# Patient Record
Sex: FEMALE | Race: WHITE | NOT HISPANIC OR LATINO | URBAN - METROPOLITAN AREA
[De-identification: names, ages, dates, MRNs, and addresses within clinical notes are randomized per-mention and may not be internally consistent; named-entity substitution may affect disease eponyms.]

---

## 2019-06-03 ENCOUNTER — OUTPATIENT (OUTPATIENT)
Dept: OUTPATIENT SERVICES | Facility: HOSPITAL | Age: 34
LOS: 1 days | Discharge: HOME | End: 2019-06-03

## 2019-06-03 ENCOUNTER — APPOINTMENT (OUTPATIENT)
Dept: ANTEPARTUM | Facility: CLINIC | Age: 34
End: 2019-06-03
Payer: MEDICARE

## 2019-06-03 ENCOUNTER — ASOB RESULT (OUTPATIENT)
Age: 34
End: 2019-06-03

## 2019-06-03 DIAGNOSIS — M26.12 OTHER JAW ASYMMETRY: Chronic | ICD-10-CM

## 2019-06-03 DIAGNOSIS — S83.209A UNSPECIFIED TEAR OF UNSPECIFIED MENISCUS, CURRENT INJURY, UNSPECIFIED KNEE, INITIAL ENCOUNTER: Chronic | ICD-10-CM

## 2019-06-03 DIAGNOSIS — O00.91 UNSPECIFIED ECTOPIC PREGNANCY WITH INTRAUTERINE PREGNANCY: ICD-10-CM

## 2019-06-03 PROCEDURE — 76801 OB US < 14 WKS SINGLE FETUS: CPT

## 2019-06-03 PROCEDURE — 96372 THER/PROPH/DIAG INJ SC/IM: CPT

## 2019-06-03 PROCEDURE — 76817 TRANSVAGINAL US OBSTETRIC: CPT

## 2019-06-03 RX ORDER — METHOTREXATE 2.5 MG/1
100 TABLET ORAL ONCE
Refills: 0 | Status: DISCONTINUED | OUTPATIENT
Start: 2019-06-03 | End: 2019-06-18

## 2020-06-19 ENCOUNTER — APPOINTMENT (OUTPATIENT)
Dept: OBGYN | Facility: CLINIC | Age: 35
End: 2020-06-19
Payer: MEDICARE

## 2020-06-19 PROCEDURE — 36415 COLL VENOUS BLD VENIPUNCTURE: CPT

## 2020-06-19 PROCEDURE — 99385 PREV VISIT NEW AGE 18-39: CPT

## 2020-06-19 PROCEDURE — 81025 URINE PREGNANCY TEST: CPT

## 2020-07-07 ENCOUNTER — APPOINTMENT (OUTPATIENT)
Dept: ANTEPARTUM | Facility: CLINIC | Age: 35
End: 2020-07-07
Payer: MEDICARE

## 2020-07-07 PROCEDURE — 76801 OB US < 14 WKS SINGLE FETUS: CPT

## 2020-07-28 ENCOUNTER — APPOINTMENT (OUTPATIENT)
Dept: ANTEPARTUM | Facility: CLINIC | Age: 35
End: 2020-07-28
Payer: MEDICARE

## 2020-07-28 PROCEDURE — 76813 OB US NUCHAL MEAS 1 GEST: CPT

## 2020-08-21 ENCOUNTER — APPOINTMENT (OUTPATIENT)
Dept: OBGYN | Facility: CLINIC | Age: 35
End: 2020-08-21
Payer: MEDICARE

## 2020-08-21 PROCEDURE — 0502F SUBSEQUENT PRENATAL CARE: CPT

## 2020-08-25 ENCOUNTER — APPOINTMENT (OUTPATIENT)
Dept: OBGYN | Facility: CLINIC | Age: 35
End: 2020-08-25

## 2020-09-18 ENCOUNTER — APPOINTMENT (OUTPATIENT)
Dept: ANTEPARTUM | Facility: CLINIC | Age: 35
End: 2020-09-18
Payer: MEDICARE

## 2020-09-18 PROCEDURE — 76817 TRANSVAGINAL US OBSTETRIC: CPT

## 2020-09-18 PROCEDURE — 76811 OB US DETAILED SNGL FETUS: CPT

## 2020-10-02 ENCOUNTER — APPOINTMENT (OUTPATIENT)
Dept: ANTEPARTUM | Facility: CLINIC | Age: 35
End: 2020-10-02
Payer: MEDICARE

## 2020-10-02 PROCEDURE — 76815 OB US LIMITED FETUS(S): CPT

## 2020-10-02 PROCEDURE — 76817 TRANSVAGINAL US OBSTETRIC: CPT

## 2020-10-13 ENCOUNTER — APPOINTMENT (OUTPATIENT)
Dept: OBGYN | Facility: CLINIC | Age: 35
End: 2020-10-13
Payer: MEDICARE

## 2020-10-13 PROCEDURE — 0502F SUBSEQUENT PRENATAL CARE: CPT

## 2020-10-16 ENCOUNTER — APPOINTMENT (OUTPATIENT)
Dept: OBGYN | Facility: CLINIC | Age: 35
End: 2020-10-16

## 2020-10-27 ENCOUNTER — APPOINTMENT (OUTPATIENT)
Dept: OBGYN | Facility: CLINIC | Age: 35
End: 2020-10-27
Payer: MEDICARE

## 2020-10-27 PROCEDURE — 0502F SUBSEQUENT PRENATAL CARE: CPT

## 2020-11-20 ENCOUNTER — APPOINTMENT (OUTPATIENT)
Dept: OBGYN | Facility: CLINIC | Age: 35
End: 2020-11-20
Payer: MEDICARE

## 2020-11-20 PROCEDURE — 0502F SUBSEQUENT PRENATAL CARE: CPT

## 2020-12-04 ENCOUNTER — APPOINTMENT (OUTPATIENT)
Dept: ANTEPARTUM | Facility: CLINIC | Age: 35
End: 2020-12-04
Payer: MEDICARE

## 2020-12-04 ENCOUNTER — APPOINTMENT (OUTPATIENT)
Dept: ANTEPARTUM | Facility: CLINIC | Age: 35
End: 2020-12-04

## 2020-12-04 PROCEDURE — 99072 ADDL SUPL MATRL&STAF TM PHE: CPT

## 2020-12-04 PROCEDURE — 76819 FETAL BIOPHYS PROFIL W/O NST: CPT

## 2020-12-04 PROCEDURE — 76816 OB US FOLLOW-UP PER FETUS: CPT

## 2020-12-18 ENCOUNTER — APPOINTMENT (OUTPATIENT)
Dept: OBGYN | Facility: CLINIC | Age: 35
End: 2020-12-18
Payer: MEDICARE

## 2020-12-18 PROCEDURE — 0502F SUBSEQUENT PRENATAL CARE: CPT

## 2020-12-22 ENCOUNTER — ASOB RESULT (OUTPATIENT)
Age: 35
End: 2020-12-22

## 2020-12-22 ENCOUNTER — APPOINTMENT (OUTPATIENT)
Dept: ANTEPARTUM | Facility: CLINIC | Age: 35
End: 2020-12-22
Payer: MEDICARE

## 2020-12-22 ENCOUNTER — INPATIENT (INPATIENT)
Facility: HOSPITAL | Age: 35
LOS: 1 days | Discharge: HOME | End: 2020-12-24
Attending: OBSTETRICS & GYNECOLOGY | Admitting: OBSTETRICS & GYNECOLOGY
Payer: MEDICAID

## 2020-12-22 ENCOUNTER — OUTPATIENT (OUTPATIENT)
Dept: OUTPATIENT SERVICES | Facility: HOSPITAL | Age: 35
LOS: 1 days | End: 2020-12-22

## 2020-12-22 VITALS
TEMPERATURE: 98 F | RESPIRATION RATE: 16 BRPM | SYSTOLIC BLOOD PRESSURE: 109 MMHG | HEART RATE: 107 BPM | DIASTOLIC BLOOD PRESSURE: 64 MMHG

## 2020-12-22 DIAGNOSIS — M26.12 OTHER JAW ASYMMETRY: Chronic | ICD-10-CM

## 2020-12-22 DIAGNOSIS — S83.209A UNSPECIFIED TEAR OF UNSPECIFIED MENISCUS, CURRENT INJURY, UNSPECIFIED KNEE, INITIAL ENCOUNTER: Chronic | ICD-10-CM

## 2020-12-22 DIAGNOSIS — O34.593 MATERNAL CARE FOR OTHER ABNORMALITIES OF GRAVID UTERUS, THIRD TRIMESTER: ICD-10-CM

## 2020-12-22 DIAGNOSIS — O26.899 OTHER SPECIFIED PREGNANCY RELATED CONDITIONS, UNSPECIFIED TRIMESTER: ICD-10-CM

## 2020-12-22 DIAGNOSIS — O46.93 ANTEPARTUM HEMORRHAGE, UNSPECIFIED, THIRD TRIMESTER: ICD-10-CM

## 2020-12-22 DIAGNOSIS — Z3A.00 WEEKS OF GESTATION OF PREGNANCY NOT SPECIFIED: ICD-10-CM

## 2020-12-22 LAB
APPEARANCE UR: ABNORMAL
APTT BLD: 30.9 SEC — SIGNIFICANT CHANGE UP (ref 27–36.3)
BACTERIA # UR AUTO: NEGATIVE — SIGNIFICANT CHANGE UP
BASOPHILS # BLD AUTO: 0.02 K/UL — SIGNIFICANT CHANGE UP (ref 0–0.2)
BASOPHILS NFR BLD AUTO: 0.3 % — SIGNIFICANT CHANGE UP (ref 0–2)
BILIRUB UR-MCNC: NEGATIVE — SIGNIFICANT CHANGE UP
BLD GP AB SCN SERPL QL: NEGATIVE — SIGNIFICANT CHANGE UP
COLOR SPEC: ABNORMAL
DIFF PNL FLD: ABNORMAL
EOSINOPHIL # BLD AUTO: 0.06 K/UL — SIGNIFICANT CHANGE UP (ref 0–0.5)
EOSINOPHIL NFR BLD AUTO: 0.9 % — SIGNIFICANT CHANGE UP (ref 0–6)
EPI CELLS # UR: 2 /HPF — SIGNIFICANT CHANGE UP (ref 0–5)
GLUCOSE UR QL: NEGATIVE — SIGNIFICANT CHANGE UP
HCT VFR BLD CALC: 32.2 % — LOW (ref 34.5–45)
HGB BLD-MCNC: 10.6 G/DL — LOW (ref 11.5–15.5)
HYALINE CASTS # UR AUTO: 1 /LPF — SIGNIFICANT CHANGE UP (ref 0–7)
IANC: 5.04 K/UL — SIGNIFICANT CHANGE UP (ref 1.5–8.5)
IMM GRANULOCYTES NFR BLD AUTO: 0.6 % — SIGNIFICANT CHANGE UP (ref 0–1.5)
INR BLD: 1.1 RATIO — SIGNIFICANT CHANGE UP (ref 0.88–1.17)
KETONES UR-MCNC: NEGATIVE — SIGNIFICANT CHANGE UP
LEUKOCYTE ESTERASE UR-ACNC: NEGATIVE — SIGNIFICANT CHANGE UP
LYMPHOCYTES # BLD AUTO: 0.98 K/UL — LOW (ref 1–3.3)
LYMPHOCYTES # BLD AUTO: 14.2 % — SIGNIFICANT CHANGE UP (ref 13–44)
MCHC RBC-ENTMCNC: 29.1 PG — SIGNIFICANT CHANGE UP (ref 27–34)
MCHC RBC-ENTMCNC: 32.9 GM/DL — SIGNIFICANT CHANGE UP (ref 32–36)
MCV RBC AUTO: 88.5 FL — SIGNIFICANT CHANGE UP (ref 80–100)
MONOCYTES # BLD AUTO: 0.74 K/UL — SIGNIFICANT CHANGE UP (ref 0–0.9)
MONOCYTES NFR BLD AUTO: 10.8 % — SIGNIFICANT CHANGE UP (ref 2–14)
NEUTROPHILS # BLD AUTO: 5.04 K/UL — SIGNIFICANT CHANGE UP (ref 1.8–7.4)
NEUTROPHILS NFR BLD AUTO: 73.2 % — SIGNIFICANT CHANGE UP (ref 43–77)
NITRITE UR-MCNC: NEGATIVE — SIGNIFICANT CHANGE UP
NRBC # BLD: 0 /100 WBCS — SIGNIFICANT CHANGE UP
NRBC # FLD: 0 K/UL — SIGNIFICANT CHANGE UP
PH UR: 7 — SIGNIFICANT CHANGE UP (ref 5–8)
PLATELET # BLD AUTO: 148 K/UL — LOW (ref 150–400)
PROT UR-MCNC: ABNORMAL
PROTHROM AB SERPL-ACNC: 12.5 SEC — SIGNIFICANT CHANGE UP (ref 9.8–13.1)
RBC # BLD: 3.64 M/UL — LOW (ref 3.8–5.2)
RBC # FLD: 13.4 % — SIGNIFICANT CHANGE UP (ref 10.3–14.5)
RBC CASTS # UR COMP ASSIST: 362 /HPF — HIGH (ref 0–4)
RH IG SCN BLD-IMP: POSITIVE — SIGNIFICANT CHANGE UP
SARS-COV-2 RNA SPEC QL NAA+PROBE: SIGNIFICANT CHANGE UP
SP GR SPEC: 1.01 — LOW (ref 1.01–1.02)
UROBILINOGEN FLD QL: SIGNIFICANT CHANGE UP
WBC # BLD: 6.88 K/UL — SIGNIFICANT CHANGE UP (ref 3.8–10.5)
WBC # FLD AUTO: 6.88 K/UL — SIGNIFICANT CHANGE UP (ref 3.8–10.5)
WBC UR QL: 4 /HPF — SIGNIFICANT CHANGE UP (ref 0–5)

## 2020-12-22 PROCEDURE — 76817 TRANSVAGINAL US OBSTETRIC: CPT | Mod: 26

## 2020-12-22 PROCEDURE — 76821 MIDDLE CEREBRAL ARTERY ECHO: CPT | Mod: 26

## 2020-12-22 PROCEDURE — 76805 OB US >/= 14 WKS SNGL FETUS: CPT | Mod: 26

## 2020-12-22 RX ORDER — SODIUM CHLORIDE 9 MG/ML
3 INJECTION INTRAMUSCULAR; INTRAVENOUS; SUBCUTANEOUS EVERY 8 HOURS
Refills: 0 | Status: DISCONTINUED | OUTPATIENT
Start: 2020-12-22 | End: 2020-12-24

## 2020-12-22 RX ADMIN — Medication 12 MILLIGRAM(S): at 11:30

## 2020-12-22 NOTE — OB PROVIDER H&P - NSHPPHYSICALEXAM_GEN_ALL_CORE
abdomen soft, nontender  taus: sliup, posterior placenta, bpp 8/8, maddie 10.8, efw 1854 grams  sse: no active bleeding noted, cervix appears closed, 5 mL blood staining in vaginal vault, negative nitrazine/negative ferning  sve: 0/0/-3/I  nst reactive  toco: no ctx noted

## 2020-12-22 NOTE — OB PROVIDER H&P - ASSESSMENT
a/p: 35 y.o. White patient  BATSHEVA 2021 @ 33.2 weeks didelphic uterus, vaginal bleeding for observation    GBS culture collected and pending  covid 19 swab collected and pending  betamethasone course  discussed with Dr Mendoza, Dr Tavares and Dr Korina Hall, NP

## 2020-12-22 NOTE — OB PROVIDER TRIAGE NOTE - NS_OBGYNHISTORY_OBGYN_ALL_OB_FT
11/18/2009 primary cs 2/2 breech presentation 7lbs 1 oz F  10/7/2012 repeat cs 7lbs 11 oz M  2019 ectopic pregnancy treated with methotrexate

## 2020-12-22 NOTE — OB RN TRIAGE NOTE - PMH
Breech presentation    Didelphic uterus    Ectopic pregnancy  2019 methotrexate  Enlarged lymph node  lung

## 2020-12-22 NOTE — OB PROVIDER TRIAGE NOTE - HISTORY OF PRESENT ILLNESS
35 y.o. White patient  BATSHEVA 2021 @ 33.2 weeks presents with c/o vaginal bleeding since 6am. Pt states first episode this pregnancy. Pt states "there was blood on underwear and clots in the toilet." Pt denies ctx/abd pain. States +FM. Pt has didelphic uterus with pregnancy in left uterus.

## 2020-12-22 NOTE — OB RN TRIAGE NOTE - PSH
H/O  section  c/s x 2 09 breech f 7-1, repeat c/s 10/7/12 m 7-11  Jaw asymmetry  surgery to repair 2001 - maxillary screws placed  Meniscus tear  right knee -

## 2020-12-23 LAB
APPEARANCE UR: CLEAR — SIGNIFICANT CHANGE UP
APTT BLD: 27 SEC — SIGNIFICANT CHANGE UP (ref 27–36.3)
BASOPHILS # BLD AUTO: 0.02 K/UL — SIGNIFICANT CHANGE UP (ref 0–0.2)
BASOPHILS NFR BLD AUTO: 0.2 % — SIGNIFICANT CHANGE UP (ref 0–2)
BILIRUB UR-MCNC: NEGATIVE — SIGNIFICANT CHANGE UP
COLOR SPEC: SIGNIFICANT CHANGE UP
DIFF PNL FLD: ABNORMAL
EOSINOPHIL # BLD AUTO: 0.01 K/UL — SIGNIFICANT CHANGE UP (ref 0–0.5)
EOSINOPHIL NFR BLD AUTO: 0.1 % — SIGNIFICANT CHANGE UP (ref 0–6)
GLUCOSE UR QL: NEGATIVE — SIGNIFICANT CHANGE UP
HCT VFR BLD CALC: 32.2 % — LOW (ref 34.5–45)
HGB BLD-MCNC: 10.4 G/DL — LOW (ref 11.5–15.5)
IANC: 7.64 K/UL — SIGNIFICANT CHANGE UP (ref 1.5–8.5)
IMM GRANULOCYTES NFR BLD AUTO: 0.5 % — SIGNIFICANT CHANGE UP (ref 0–1.5)
INR BLD: 1.16 RATIO — SIGNIFICANT CHANGE UP (ref 0.88–1.17)
KETONES UR-MCNC: ABNORMAL
KLEIHAUER-BETKE CALCULATION: 0.1 % — SIGNIFICANT CHANGE UP
LEUKOCYTE ESTERASE UR-ACNC: NEGATIVE — SIGNIFICANT CHANGE UP
LYMPHOCYTES # BLD AUTO: 1.05 K/UL — SIGNIFICANT CHANGE UP (ref 1–3.3)
LYMPHOCYTES # BLD AUTO: 11 % — LOW (ref 13–44)
MCHC RBC-ENTMCNC: 28.8 PG — SIGNIFICANT CHANGE UP (ref 27–34)
MCHC RBC-ENTMCNC: 32.3 GM/DL — SIGNIFICANT CHANGE UP (ref 32–36)
MCV RBC AUTO: 89.2 FL — SIGNIFICANT CHANGE UP (ref 80–100)
MONOCYTES # BLD AUTO: 0.77 K/UL — SIGNIFICANT CHANGE UP (ref 0–0.9)
MONOCYTES NFR BLD AUTO: 8.1 % — SIGNIFICANT CHANGE UP (ref 2–14)
NEUTROPHILS # BLD AUTO: 7.64 K/UL — HIGH (ref 1.8–7.4)
NEUTROPHILS NFR BLD AUTO: 80.1 % — HIGH (ref 43–77)
NITRITE UR-MCNC: NEGATIVE — SIGNIFICANT CHANGE UP
NRBC # BLD: 0 /100 WBCS — SIGNIFICANT CHANGE UP
NRBC # FLD: 0 K/UL — SIGNIFICANT CHANGE UP
PH UR: 6 — SIGNIFICANT CHANGE UP (ref 5–8)
PLATELET # BLD AUTO: 141 K/UL — LOW (ref 150–400)
PROT UR-MCNC: NEGATIVE — SIGNIFICANT CHANGE UP
PROTHROM AB SERPL-ACNC: 13.2 SEC — HIGH (ref 9.8–13.1)
RBC # BLD: 3.61 M/UL — LOW (ref 3.8–5.2)
RBC # FLD: 13.3 % — SIGNIFICANT CHANGE UP (ref 10.3–14.5)
SARS-COV-2 IGG SERPL QL IA: NEGATIVE — SIGNIFICANT CHANGE UP
SARS-COV-2 IGM SERPL IA-ACNC: 0.01 INDEX — SIGNIFICANT CHANGE UP
SP GR SPEC: 1.01 — SIGNIFICANT CHANGE UP (ref 1.01–1.02)
T PALLIDUM AB TITR SER: NEGATIVE — SIGNIFICANT CHANGE UP
UROBILINOGEN FLD QL: SIGNIFICANT CHANGE UP
WBC # BLD: 9.54 K/UL — SIGNIFICANT CHANGE UP (ref 3.8–10.5)
WBC # FLD AUTO: 9.54 K/UL — SIGNIFICANT CHANGE UP (ref 3.8–10.5)

## 2020-12-23 PROCEDURE — 99232 SBSQ HOSP IP/OBS MODERATE 35: CPT | Mod: GC

## 2020-12-23 RX ORDER — FAMOTIDINE 10 MG/ML
20 INJECTION INTRAVENOUS DAILY
Refills: 0 | Status: DISCONTINUED | OUTPATIENT
Start: 2020-12-23 | End: 2020-12-24

## 2020-12-23 RX ORDER — FAMOTIDINE 10 MG/ML
20 INJECTION INTRAVENOUS ONCE
Refills: 0 | Status: COMPLETED | OUTPATIENT
Start: 2020-12-23 | End: 2020-12-23

## 2020-12-23 RX ADMIN — FAMOTIDINE 20 MILLIGRAM(S): 10 INJECTION INTRAVENOUS at 23:07

## 2020-12-23 RX ADMIN — SODIUM CHLORIDE 3 MILLILITER(S): 9 INJECTION INTRAMUSCULAR; INTRAVENOUS; SUBCUTANEOUS at 21:51

## 2020-12-23 RX ADMIN — SODIUM CHLORIDE 3 MILLILITER(S): 9 INJECTION INTRAMUSCULAR; INTRAVENOUS; SUBCUTANEOUS at 06:43

## 2020-12-23 RX ADMIN — FAMOTIDINE 20 MILLIGRAM(S): 10 INJECTION INTRAVENOUS at 13:56

## 2020-12-23 RX ADMIN — SODIUM CHLORIDE 3 MILLILITER(S): 9 INJECTION INTRAMUSCULAR; INTRAVENOUS; SUBCUTANEOUS at 13:57

## 2020-12-23 RX ADMIN — Medication 12 MILLIGRAM(S): at 11:41

## 2020-12-24 ENCOUNTER — TRANSCRIPTION ENCOUNTER (OUTPATIENT)
Age: 35
End: 2020-12-24

## 2020-12-24 VITALS
RESPIRATION RATE: 17 BRPM | TEMPERATURE: 99 F | OXYGEN SATURATION: 97 % | SYSTOLIC BLOOD PRESSURE: 97 MMHG | HEART RATE: 86 BPM | DIASTOLIC BLOOD PRESSURE: 51 MMHG

## 2020-12-24 DIAGNOSIS — Z04.9 ENCOUNTER FOR EXAMINATION AND OBSERVATION FOR UNSPECIFIED REASON: ICD-10-CM

## 2020-12-24 PROBLEM — Q51.28 OTHER AND UNSPECIFIED DOUBLING OF UTERUS: Chronic | Status: ACTIVE | Noted: 2020-12-22

## 2020-12-24 PROBLEM — O00.90 UNSPECIFIED ECTOPIC PREGNANCY WITHOUT INTRAUTERINE PREGNANCY: Chronic | Status: ACTIVE | Noted: 2020-12-22

## 2020-12-24 LAB
CULTURE RESULTS: SIGNIFICANT CHANGE UP
HCT VFR BLD CALC: 32.2 % — LOW (ref 34.5–45)
HGB BLD-MCNC: 10.1 G/DL — LOW (ref 11.5–15.5)
MCHC RBC-ENTMCNC: 28 PG — SIGNIFICANT CHANGE UP (ref 27–34)
MCHC RBC-ENTMCNC: 31.4 GM/DL — LOW (ref 32–36)
MCV RBC AUTO: 89.2 FL — SIGNIFICANT CHANGE UP (ref 80–100)
NRBC # BLD: 0 /100 WBCS — SIGNIFICANT CHANGE UP
NRBC # FLD: 0 K/UL — SIGNIFICANT CHANGE UP
PLATELET # BLD AUTO: 151 K/UL — SIGNIFICANT CHANGE UP (ref 150–400)
RBC # BLD: 3.61 M/UL — LOW (ref 3.8–5.2)
RBC # FLD: 13.5 % — SIGNIFICANT CHANGE UP (ref 10.3–14.5)
RH IG SCN BLD-IMP: POSITIVE — SIGNIFICANT CHANGE UP
SPECIMEN SOURCE: SIGNIFICANT CHANGE UP
WBC # BLD: 8.35 K/UL — SIGNIFICANT CHANGE UP (ref 3.8–10.5)
WBC # FLD AUTO: 8.35 K/UL — SIGNIFICANT CHANGE UP (ref 3.8–10.5)

## 2020-12-24 PROCEDURE — 99232 SBSQ HOSP IP/OBS MODERATE 35: CPT | Mod: GC

## 2020-12-24 RX ADMIN — SODIUM CHLORIDE 3 MILLILITER(S): 9 INJECTION INTRAMUSCULAR; INTRAVENOUS; SUBCUTANEOUS at 07:00

## 2020-12-24 NOTE — PROGRESS NOTE ADULT - ASSESSMENT
A/P: Pt is a 36yo  @ 33.4wks admitted with vaginal bleeding in setting of Didelphys uterus. Patient is beta complete. FHR reassuring. ATU sono normal. VB nearly resolved    #Vaginal bleeding  -Continue Pad counts  -BMZ for fetal lung maturity from -  -Continue fetal monitoring  -f/u AM CBC    #FWB  -ATU Sono: Vtx, Left lateral placenta, ROLANDO 15.2, EFW 2105g, CL 4.15, Dopplers wnl  -Continue NST BID    #MWB  -Reg diet  -Continue PNV  -d/c today after NST    Msantandreu PGY3
    A/P: Pt is a 34yo  @ 33.3wks admitted with vaginal bleeding in setting of Didelphys uterus. Patient had received BMZ x1 yesterday. VB has nearly resolved. Fetal monitoring reassuring.     #Vaginal bleeding  -Continue Pad counts  -BMZ for fetal lung maturity from -  -Continue fetal monitoring  -f/u AM CBC, KB, Coags    #FWB  -ATU Sono: Vtx, Left lateral placenta, ROLANDO 15.2, EFW 2105g, CL 4.15, Dopplers wnl  -Continue NST BID    #MWB  -Reg diet  -Continue PNV    Msantandreu PGY3

## 2020-12-24 NOTE — PROGRESS NOTE ADULT - SUBJECTIVE AND OBJECTIVE BOX
R3 Progress note: HD#2    Patient seen and examined at bedside, no acute overnight events. No acute complaints. Patient states that VB has significantly improved. She only sees small amount of staining while wiping. No clots or keith bright red blood.     Pt reports +FM. Denies LOF, ctx, HA, epigastric pain, blurred vision, CP, SOB, N/V, fevers, and chills.    Vital Signs Last 24 Hours  T(C): 36.7 (12-23-20 @ 06:22), Max: 36.8 (12-22-20 @ 13:37)  HR: 83 (12-23-20 @ 06:22) (75 - 99)  BP: 94/52 (12-23-20 @ 06:22) (89/60 - 111/61)  RR: 20 (12-23-20 @ 06:22) (16 - 20)  SpO2: 97% (12-23-20 @ 06:22) (94% - 99%)    CAPILLARY BLOOD GLUCOSE      Physical Exam:  General: NAD  Abdomen: Soft, non-tender, gravid  Ext: No pain or swelling    Labs:             10.4   9.54  )-----------( 141      ( 12-23 @ 06:45 )             32.2           PT/INR - ( 12-23 @ 06:45 )   PT: 13.2 sec;   INR: 1.16 ratio    PTT - ( 12-23 @ 06:45 )  PTT:27.0 sec        MEDICATIONS  (STANDING):  betamethasone Injectable 12 milliGRAM(s) IntraMuscular every 24 hours  sodium chloride 0.9% lock flush 3 milliLiter(s) IV Push every 8 hours    MEDICATIONS  (PRN):  
R3 Progress note: HD#3    Patient seen and examined at bedside, no acute overnight events. No acute complaints. Patient says that her spotting is minimal with wiping and in now dark brown.    Pt reports +FM. Denies LOF, VB, ctx, HA, epigastric pain, blurred vision, CP, SOB, N/V, fevers, and chills.    Vital Signs Last 24 Hours  T(C): 36.8 (12-24-20 @ 05:44), Max: 37.1 (12-23-20 @ 17:48)  HR: 82 (12-24-20 @ 05:44) (79 - 98)  BP: 97/55 (12-24-20 @ 05:44) (97/55 - 114/62)  RR: 18 (12-24-20 @ 05:44) (17 - 18)  SpO2: 95% (12-24-20 @ 05:44) (93% - 98%)    CAPILLARY BLOOD GLUCOSE          Physical Exam:  General: NAD  Abdomen: Soft, non-tender, gravid  Ext: No pain or swelling    Labs:             10.1   8.35  )-----------( 151      ( 12-24 @ 07:07 )             32.2           PT/INR - ( 12-23 @ 06:45 )   PT: 13.2 sec;   INR: 1.16 ratio    PTT - ( 12-23 @ 06:45 )  PTT:27.0 sec        MEDICATIONS  (STANDING):  famotidine    Tablet 20 milliGRAM(s) Oral daily  sodium chloride 0.9% lock flush 3 milliLiter(s) IV Push every 8 hours    MEDICATIONS  (PRN):

## 2020-12-24 NOTE — DISCHARGE NOTE ANTEPARTUM - PLAN OF CARE
- Follow up with Dr. Tavares on Monday 12/28  - Return with contractions, vaginal bleeding, leaking fluid or decreased fetal movement Recovery/ continue prenatal course

## 2020-12-24 NOTE — DISCHARGE NOTE ANTEPARTUM - HOSPITAL COURSE
Pt is a 36yo  at 33.4wks admitted with vaginal bleeding in setting of Didelphys uterus. On admission patient with no active bleeding noted on SSE. ATU sono WNL Vtx, Left lateral placenta, ROLANDO 15.2, EFW 2105g, CL 4.15, Dopplers wnl. BMZ for fetal lung maturity from -. Patient with no further vaginal bleeding. Denies contractions, leaking fluid. Endorses good fetal movement. Pt stable and ready to be discharged home with strict PTL precautions and close outpatient follow up in office on Monday.

## 2020-12-24 NOTE — DISCHARGE NOTE ANTEPARTUM - CARE PROVIDER_API CALL
Raleigh Tavares  MATERNAL/FETAL MEDICINE  [address] [place] [address]  Phone: [phone]  Fax: [phone]  Follow Up Time:

## 2020-12-24 NOTE — DISCHARGE NOTE ANTEPARTUM - CARE PLAN
Principal Discharge DX:	Vaginal bleeding in pregnancy, third trimester  Goal:	Recovery/ continue prenatal course  Assessment and plan of treatment:	- Follow up with Dr. Tavares on Monday 12/28  - Return with contractions, vaginal bleeding, leaking fluid or decreased fetal movement

## 2020-12-24 NOTE — DISCHARGE NOTE ANTEPARTUM - MEDICATION SUMMARY - MEDICATIONS TO TAKE
I will START or STAY ON the medications listed below when I get home from the hospital:    Pepcid  -- orally once a day (at bedtime)  -- Indication: For GERD    Prena1 oral capsule  -- 1 cap(s) by mouth once a day  -- Indication: For Pregnancy

## 2020-12-24 NOTE — DISCHARGE NOTE ANTEPARTUM - PATIENT PORTAL LINK FT
You can access the FollowMyHealth Patient Portal offered by [facility] by registering at the following website: [email or web address]. By joining [employer]’s FollowMyHealth portal, you will also be able to view your health information using other applications (apps) compatible with our system.

## 2020-12-24 NOTE — PROGRESS NOTE ADULT - ATTENDING COMMENTS
MFM Fellow addendum  seen and evaluated w Felipa Tavares and Korina  No further bleeding, feeling well  will d/c home at this time, has f/u in Anusha's office on Monday    MD BRITT PateM Fellow
MFM note:  Patient was seen and examined.  No complains now but had some bright red bleeding this morning  No contractions and FHR tracing is category I  Since this is new fresh bleeding will continue t o observe in hospital

## 2020-12-26 ENCOUNTER — TRANSCRIPTION ENCOUNTER (OUTPATIENT)
Age: 35
End: 2020-12-26

## 2020-12-26 ENCOUNTER — RESULT REVIEW (OUTPATIENT)
Age: 35
End: 2020-12-26

## 2020-12-26 ENCOUNTER — INPATIENT (INPATIENT)
Facility: HOSPITAL | Age: 35
LOS: 3 days | Discharge: HOME | End: 2020-12-30
Attending: OBSTETRICS & GYNECOLOGY | Admitting: OBSTETRICS & GYNECOLOGY
Payer: MEDICAID

## 2020-12-26 VITALS
HEART RATE: 100 BPM | TEMPERATURE: 98 F | RESPIRATION RATE: 20 BRPM | DIASTOLIC BLOOD PRESSURE: 65 MMHG | SYSTOLIC BLOOD PRESSURE: 108 MMHG

## 2020-12-26 DIAGNOSIS — O26.899 OTHER SPECIFIED PREGNANCY RELATED CONDITIONS, UNSPECIFIED TRIMESTER: ICD-10-CM

## 2020-12-26 DIAGNOSIS — M26.12 OTHER JAW ASYMMETRY: Chronic | ICD-10-CM

## 2020-12-26 DIAGNOSIS — Z3A.00 WEEKS OF GESTATION OF PREGNANCY NOT SPECIFIED: ICD-10-CM

## 2020-12-26 DIAGNOSIS — S83.209A UNSPECIFIED TEAR OF UNSPECIFIED MENISCUS, CURRENT INJURY, UNSPECIFIED KNEE, INITIAL ENCOUNTER: Chronic | ICD-10-CM

## 2020-12-26 LAB
APPEARANCE UR: CLEAR — SIGNIFICANT CHANGE UP
BASOPHILS # BLD AUTO: 0.02 K/UL — SIGNIFICANT CHANGE UP (ref 0–0.2)
BASOPHILS NFR BLD AUTO: 0.2 % — SIGNIFICANT CHANGE UP (ref 0–2)
BILIRUB UR-MCNC: NEGATIVE — SIGNIFICANT CHANGE UP
COLOR SPEC: COLORLESS — SIGNIFICANT CHANGE UP
DIFF PNL FLD: ABNORMAL
EOSINOPHIL # BLD AUTO: 0.07 K/UL — SIGNIFICANT CHANGE UP (ref 0–0.5)
EOSINOPHIL NFR BLD AUTO: 0.6 % — SIGNIFICANT CHANGE UP (ref 0–6)
GLUCOSE UR QL: NEGATIVE — SIGNIFICANT CHANGE UP
HCT VFR BLD CALC: 34.2 % — LOW (ref 34.5–45)
HGB BLD-MCNC: 11.3 G/DL — LOW (ref 11.5–15.5)
IANC: 8.73 K/UL — HIGH (ref 1.5–8.5)
IMM GRANULOCYTES NFR BLD AUTO: 0.3 % — SIGNIFICANT CHANGE UP (ref 0–1.5)
KETONES UR-MCNC: NEGATIVE — SIGNIFICANT CHANGE UP
LEUKOCYTE ESTERASE UR-ACNC: NEGATIVE — SIGNIFICANT CHANGE UP
LYMPHOCYTES # BLD AUTO: 1.49 K/UL — SIGNIFICANT CHANGE UP (ref 1–3.3)
LYMPHOCYTES # BLD AUTO: 12.9 % — LOW (ref 13–44)
MCHC RBC-ENTMCNC: 28.2 PG — SIGNIFICANT CHANGE UP (ref 27–34)
MCHC RBC-ENTMCNC: 33 GM/DL — SIGNIFICANT CHANGE UP (ref 32–36)
MCV RBC AUTO: 85.3 FL — SIGNIFICANT CHANGE UP (ref 80–100)
MONOCYTES # BLD AUTO: 1.16 K/UL — HIGH (ref 0–0.9)
MONOCYTES NFR BLD AUTO: 10.1 % — SIGNIFICANT CHANGE UP (ref 2–14)
NEUTROPHILS # BLD AUTO: 8.73 K/UL — HIGH (ref 1.8–7.4)
NEUTROPHILS NFR BLD AUTO: 75.9 % — SIGNIFICANT CHANGE UP (ref 43–77)
NITRITE UR-MCNC: NEGATIVE — SIGNIFICANT CHANGE UP
NRBC # BLD: 0 /100 WBCS — SIGNIFICANT CHANGE UP
NRBC # FLD: 0 K/UL — SIGNIFICANT CHANGE UP
PH UR: 7 — SIGNIFICANT CHANGE UP (ref 5–8)
PLATELET # BLD AUTO: 174 K/UL — SIGNIFICANT CHANGE UP (ref 150–400)
PROT UR-MCNC: NEGATIVE — SIGNIFICANT CHANGE UP
RBC # BLD: 4.01 M/UL — SIGNIFICANT CHANGE UP (ref 3.8–5.2)
RBC # FLD: 13.2 % — SIGNIFICANT CHANGE UP (ref 10.3–14.5)
SP GR SPEC: 1.01 — LOW (ref 1.01–1.02)
UROBILINOGEN FLD QL: SIGNIFICANT CHANGE UP
WBC # BLD: 11.51 K/UL — HIGH (ref 3.8–10.5)
WBC # FLD AUTO: 11.51 K/UL — HIGH (ref 3.8–10.5)

## 2020-12-26 RX ORDER — OXYTOCIN 10 UNIT/ML
333.33 VIAL (ML) INJECTION
Qty: 20 | Refills: 0 | Status: DISCONTINUED | OUTPATIENT
Start: 2020-12-26 | End: 2020-12-27

## 2020-12-26 RX ORDER — SODIUM CHLORIDE 9 MG/ML
1000 INJECTION, SOLUTION INTRAVENOUS
Refills: 0 | Status: DISCONTINUED | OUTPATIENT
Start: 2020-12-26 | End: 2020-12-27

## 2020-12-26 RX ORDER — SODIUM CHLORIDE 9 MG/ML
1000 INJECTION, SOLUTION INTRAVENOUS ONCE
Refills: 0 | Status: DISCONTINUED | OUTPATIENT
Start: 2020-12-26 | End: 2020-12-27

## 2020-12-26 RX ORDER — FAMOTIDINE 10 MG/ML
20 INJECTION INTRAVENOUS ONCE
Refills: 0 | Status: DISCONTINUED | OUTPATIENT
Start: 2020-12-26 | End: 2020-12-27

## 2020-12-26 RX ORDER — METOCLOPRAMIDE HCL 10 MG
10 TABLET ORAL ONCE
Refills: 0 | Status: DISCONTINUED | OUTPATIENT
Start: 2020-12-26 | End: 2020-12-27

## 2020-12-26 NOTE — OB PROVIDER TRIAGE NOTE - NSHPPHYSICALEXAM_GEN_ALL_CORE
Vital Signs Last 24 Hrs  T(C): 36.6 (26 Dec 2020 23:28), Max: 36.6 (26 Dec 2020 22:01)  T(F): 97.9 (26 Dec 2020 23:28), Max: 97.9 (26 Dec 2020 22:01)  HR: 100 (26 Dec 2020 23:28) (100 - 100)  BP: 108/65 (26 Dec 2020 23:28) (108/65 - 108/65)  BP(mean): --  RR: 16 (26 Dec 2020 23:28) (16 - 20)  SpO2: --    General: A&O x3   Abdomen: soft, non tender. no guarding or rebound tenderness  SSE:   dark brown mucousy discharge noted in vault  negative pooling  negative nitrazine  negative ferning   SVE: 1/50/-3  TAS: vertex presentation    NST in progress

## 2020-12-26 NOTE — OB PROVIDER TRIAGE NOTE - HISTORY OF PRESENT ILLNESS
1y y/o pt 33.6 weeks  24 y/o pt 33.6 weeks  presents to triage with c/o of painful contractions every 5 minutes. pt reports 9/10 pain with contraction. pt also states one episode of vaginal spotting, dark blood on underwear. pt denies any recent vaginal exam or intercourse. pt denies any LOF, n/v/d, fever or chills. pt endorses +fetal movement.   AP complicated by:  -Didelphys uterus  -Admitted on - for vaginal bleeding; s/p Betamethasone course on 20 & 2020  Last PO @ 20:00    Allergy:   Penicillin- hives  Colchicine-hives  PMH:   Enlarged lymph node in lung- benign  PSH:   Right meniscus tear repeat 15'  Jaw surgery- maxillary screws in place   OB:   Primary  2009 breech 7#1  Repeat  10/07/2012 breech 7#11  Ectopic pregnancy 09' s/p methotrexate  GYN: denies  Social hx: denies  Medications: PNV

## 2020-12-26 NOTE — OB PROVIDER TRIAGE NOTE - NSOBPROVIDERNOTE_OBGYN_ALL_OB_FT
Vital Signs Last 24 Hrs  T(C): 36.6 (26 Dec 2020 23:28), Max: 36.6 (26 Dec 2020 22:01)  T(F): 97.9 (26 Dec 2020 23:28), Max: 97.9 (26 Dec 2020 22:01)  HR: 100 (26 Dec 2020 23:28) (100 - 100)  BP: 108/65 (26 Dec 2020 23:28) (108/65 - 108/65)  BP(mean): --  RR: 16 (26 Dec 2020 23:28) (16 - 20)  SpO2: --    General: A&O x3   Abdomen: soft, non tender. no guarding or rebound tenderness  SSE:   dark brown mucousy discharge noted in vault  negative pooling  negative nitrazine  negative ferning   SVE: /-3  TAS: vertex presentation    NST reactive with moderate variability, cat 1  toco ctx q5-6 minutes    @ 2245  d/w with Dr Harrell  -Admit for observation for  labor vs placental abruption   -STAT 1L bolus      @ 2320  Patient reports increased pain   d/w with Dr Harrell & Dr Couch   Plan:  -Admit @ 33.6 weeks for repeat  for  labor   -NPO diet  -Routine labs  -2units PRBC on hold   -Fetus: cat 1 tracing, continuous monitoring   -GBS negative   -Covid 19 pending for patient and support person   -Consents signed and witnessed at bedside     @ 2330  Huddle with Dr Harrell, Dr Couch, Dr Salazar, charge RN

## 2020-12-26 NOTE — OB PROVIDER TRIAGE NOTE - NS_OBGYNHISTORY_OBGYN_ALL_OB_FT
OB:   Primary  2009 breech 7#1  Repeat  10/07/2012 breech 7#11  Ectopic pregnancy 09' s/p methotrexate  GYN: christineies

## 2020-12-27 LAB
HBV SURFACE AG SER-ACNC: SIGNIFICANT CHANGE UP
HIV 1+2 AB+HIV1 P24 AG SERPL QL IA: SIGNIFICANT CHANGE UP
SARS-COV-2 IGG SERPL QL IA: NEGATIVE — SIGNIFICANT CHANGE UP
SARS-COV-2 IGM SERPL IA-ACNC: 0.06 INDEX — SIGNIFICANT CHANGE UP
SARS-COV-2 RNA SPEC QL NAA+PROBE: SIGNIFICANT CHANGE UP

## 2020-12-27 PROCEDURE — 59510 CESAREAN DELIVERY: CPT | Mod: U7

## 2020-12-27 PROCEDURE — 88307 TISSUE EXAM BY PATHOLOGIST: CPT | Mod: 26

## 2020-12-27 RX ORDER — ACETAMINOPHEN 500 MG
975 TABLET ORAL
Refills: 0 | Status: DISCONTINUED | OUTPATIENT
Start: 2020-12-27 | End: 2020-12-30

## 2020-12-27 RX ORDER — TETANUS TOXOID, REDUCED DIPHTHERIA TOXOID AND ACELLULAR PERTUSSIS VACCINE, ADSORBED 5; 2.5; 8; 8; 2.5 [IU]/.5ML; [IU]/.5ML; UG/.5ML; UG/.5ML; UG/.5ML
0.5 SUSPENSION INTRAMUSCULAR ONCE
Refills: 0 | Status: DISCONTINUED | OUTPATIENT
Start: 2020-12-27 | End: 2020-12-30

## 2020-12-27 RX ORDER — FERROUS SULFATE 325(65) MG
325 TABLET ORAL DAILY
Refills: 0 | Status: DISCONTINUED | OUTPATIENT
Start: 2020-12-27 | End: 2020-12-30

## 2020-12-27 RX ORDER — DEXAMETHASONE 0.5 MG/5ML
4 ELIXIR ORAL EVERY 6 HOURS
Refills: 0 | Status: DISCONTINUED | OUTPATIENT
Start: 2020-12-27 | End: 2020-12-29

## 2020-12-27 RX ORDER — NALOXONE HYDROCHLORIDE 4 MG/.1ML
0.1 SPRAY NASAL
Refills: 0 | Status: DISCONTINUED | OUTPATIENT
Start: 2020-12-27 | End: 2020-12-29

## 2020-12-27 RX ORDER — OXYCODONE HYDROCHLORIDE 5 MG/1
5 TABLET ORAL ONCE
Refills: 0 | Status: DISCONTINUED | OUTPATIENT
Start: 2020-12-27 | End: 2020-12-30

## 2020-12-27 RX ORDER — LANOLIN
1 OINTMENT (GRAM) TOPICAL EVERY 6 HOURS
Refills: 0 | Status: DISCONTINUED | OUTPATIENT
Start: 2020-12-27 | End: 2020-12-30

## 2020-12-27 RX ORDER — OXYCODONE HYDROCHLORIDE 5 MG/1
5 TABLET ORAL
Refills: 0 | Status: DISCONTINUED | OUTPATIENT
Start: 2020-12-27 | End: 2020-12-27

## 2020-12-27 RX ORDER — OXYCODONE HYDROCHLORIDE 5 MG/1
10 TABLET ORAL
Refills: 0 | Status: DISCONTINUED | OUTPATIENT
Start: 2020-12-27 | End: 2020-12-27

## 2020-12-27 RX ORDER — KETOROLAC TROMETHAMINE 30 MG/ML
30 SYRINGE (ML) INJECTION EVERY 6 HOURS
Refills: 0 | Status: DISCONTINUED | OUTPATIENT
Start: 2020-12-27 | End: 2020-12-28

## 2020-12-27 RX ORDER — MAGNESIUM HYDROXIDE 400 MG/1
30 TABLET, CHEWABLE ORAL
Refills: 0 | Status: DISCONTINUED | OUTPATIENT
Start: 2020-12-27 | End: 2020-12-30

## 2020-12-27 RX ORDER — DIPHENHYDRAMINE HCL 50 MG
25 CAPSULE ORAL EVERY 6 HOURS
Refills: 0 | Status: DISCONTINUED | OUTPATIENT
Start: 2020-12-27 | End: 2020-12-30

## 2020-12-27 RX ORDER — OXYCODONE HYDROCHLORIDE 5 MG/1
5 TABLET ORAL
Refills: 0 | Status: COMPLETED | OUTPATIENT
Start: 2020-12-27 | End: 2021-01-03

## 2020-12-27 RX ORDER — IBUPROFEN 200 MG
600 TABLET ORAL EVERY 6 HOURS
Refills: 0 | Status: COMPLETED | OUTPATIENT
Start: 2020-12-27 | End: 2021-11-25

## 2020-12-27 RX ORDER — SODIUM CHLORIDE 9 MG/ML
1000 INJECTION, SOLUTION INTRAVENOUS
Refills: 0 | Status: DISCONTINUED | OUTPATIENT
Start: 2020-12-27 | End: 2020-12-27

## 2020-12-27 RX ORDER — BUTORPHANOL TARTRATE 2 MG/ML
0.12 INJECTION, SOLUTION INTRAMUSCULAR; INTRAVENOUS EVERY 6 HOURS
Refills: 0 | Status: DISCONTINUED | OUTPATIENT
Start: 2020-12-27 | End: 2020-12-27

## 2020-12-27 RX ORDER — HEPARIN SODIUM 5000 [USP'U]/ML
5000 INJECTION INTRAVENOUS; SUBCUTANEOUS EVERY 12 HOURS
Refills: 0 | Status: DISCONTINUED | OUTPATIENT
Start: 2020-12-27 | End: 2020-12-30

## 2020-12-27 RX ORDER — OXYTOCIN 10 UNIT/ML
333.33 VIAL (ML) INJECTION
Qty: 20 | Refills: 0 | Status: DISCONTINUED | OUTPATIENT
Start: 2020-12-27 | End: 2020-12-27

## 2020-12-27 RX ORDER — ONDANSETRON 8 MG/1
4 TABLET, FILM COATED ORAL EVERY 6 HOURS
Refills: 0 | Status: DISCONTINUED | OUTPATIENT
Start: 2020-12-27 | End: 2020-12-29

## 2020-12-27 RX ORDER — SIMETHICONE 80 MG/1
80 TABLET, CHEWABLE ORAL EVERY 4 HOURS
Refills: 0 | Status: DISCONTINUED | OUTPATIENT
Start: 2020-12-27 | End: 2020-12-30

## 2020-12-27 RX ADMIN — Medication 30 MILLIGRAM(S): at 01:57

## 2020-12-27 RX ADMIN — Medication 30 MILLIGRAM(S): at 17:36

## 2020-12-27 RX ADMIN — Medication 975 MILLIGRAM(S): at 10:00

## 2020-12-27 RX ADMIN — Medication 30 MILLIGRAM(S): at 18:06

## 2020-12-27 RX ADMIN — Medication 30 MILLIGRAM(S): at 11:59

## 2020-12-27 RX ADMIN — Medication 975 MILLIGRAM(S): at 09:12

## 2020-12-27 RX ADMIN — Medication 30 MILLIGRAM(S): at 12:29

## 2020-12-27 RX ADMIN — HEPARIN SODIUM 5000 UNIT(S): 5000 INJECTION INTRAVENOUS; SUBCUTANEOUS at 06:02

## 2020-12-27 RX ADMIN — Medication 975 MILLIGRAM(S): at 23:12

## 2020-12-27 RX ADMIN — HEPARIN SODIUM 5000 UNIT(S): 5000 INJECTION INTRAVENOUS; SUBCUTANEOUS at 17:36

## 2020-12-27 RX ADMIN — Medication 30 MILLIGRAM(S): at 00:50

## 2020-12-27 NOTE — OB PROVIDER H&P - PROBLEM SELECTOR PLAN 1
-Admit @ 33.6 weeks for repeat  for  labor   -NPO diet  -Routine labs  -2units PRBC on hold   -Fetus: cat 1 tracing, continuous monitoring   -GBS negative   -Covid 19 pending for patient and support person   -Consents signed and witnessed at bedside

## 2020-12-27 NOTE — OB PROVIDER DELIVERY SUMMARY - NSPROVIDERDELIVERYNOTE_OBGYN_ALL_OB_FT
unscheduled rLTCS, pt in labor with vaginal bleeding and late decelerations  Viable female infant, apgars 7/8, weight 2035g  Hysterotomy closed in 1 layer using vicryl  Fibrillar placed over hysterotomy   Didelphys uterus, grossly normals tubes, and ovaries  Abdomen closed in standard fashion  Arnav placed over rectus muscle  Pt to recovery in stable condition  Placenta to pathology  EBL:600   IVF:1500    UOP:1100 unscheduled rLTCS, pt in labor with vaginal bleeding and late decelerations  Viable female infant, apgars 7/8, weight 2035g  Small dark clot noted upon removal of placenta  Hysterotomy closed in 1 layer using vicryl  Fibrillar placed over hysterotomy   Didelphys uterus, grossly normals tubes, and ovaries  Abdomen closed in standard fashion  Arnav placed over rectus muscle  Pt to recovery in stable condition  Placenta to pathology  EBL:600   IVF:1500    UOP:1100

## 2020-12-27 NOTE — DISCHARGE NOTE OB - CARE PROVIDER_API CALL
Raleigh Tavares  MATERNAL/FETAL MEDICINE  [address] [place] [address]  Phone: [phone]  Fax: [phone]  Follow Up Time:

## 2020-12-27 NOTE — OB RN DELIVERY SUMMARY - NS_CORDVESSELS_OBGYN_ALL_OB
34 y/o female with PMHx of pulm htn, GERD, asthma on home O2 2L at bedtime, chronic PE on xarelto, anemia sent in by pulmonologist Dr. Vivian Yoon to be admitted for further workup for worsening dyspnea on exertion. Patient stated she was recently admitted to [facility] in [place] for 3 days after syncopal event and workup was overall negative. Patient stated she has SOB at rest and worsens with ambulation. Has not used her inhaler more frequently.  Patient admits a nonproductive cough over the last few days but denied orthopnea or paroxysmal nocturnal dyspnea. Patient stated she has sharp intermittent epigastric pain as well but denied N/V/D, radiation of pain, or fever.
3

## 2020-12-27 NOTE — DISCHARGE NOTE OB - PATIENT PORTAL LINK FT
You can access the FollowMyHealth Patient Portal offered by [facility] by registering at the following website: [email or web address]. By joining [employer]’s FollowMyHealth portal, you will also be able to view your health information using other applications (apps) compatible with our system.

## 2020-12-27 NOTE — BRIEF OPERATIVE NOTE - OPERATION/FINDINGS
unscheduled rLTCS, pt in labor with vaginal bleeding and late decelerations  Viable female infant, apgars 7/8, weight 2035g  Small dark clot noted upon removal of placenta  Hysterotomy closed in 1 layer using vicryl  Fibrillar placed over hysterotomy   Didelphys uterus, grossly normals tubes, and ovaries  Abdomen closed in standard fashion  Arnav placed over rectus muscle  Pt to recovery in stable condition  Placenta to pathology  EBL:600   IVF:1500    UOP:1100

## 2020-12-27 NOTE — OB RN DELIVERY SUMMARY - AS DELIV COMPLICATIONS OB
abnormal fetal heart rate tracing/abruptio placenta abnormal fetal heart rate tracing/abruptio placenta/nuchal cord

## 2020-12-27 NOTE — OB RN DELIVERY SUMMARY - NS_SEPSISRSKCALC_OBGYN_ALL_OB_FT
EOS calculated successfully. EOS Risk Factor: 0.5/1000 live births ([facility] national incidence); GA=34w;Temp=97.9; ROM=0; GBS='Negative'; Antibiotics='No antibiotics or any antibiotics < 2 hrs prior to birth'

## 2020-12-27 NOTE — DISCHARGE NOTE OB - MEDICATION SUMMARY - MEDICATIONS TO TAKE
I will START or STAY ON the medications listed below when I get home from the hospital:  None I will START or STAY ON the medications listed below when I get home from the hospital:    ibuprofen 600 mg oral tablet  -- 1 tab(s) by mouth every 6 hours, As Needed  -- Indication: For pain    acetaminophen 325 mg oral tablet  -- 3 tab(s) by mouth , As Needed  -- Indication: For pain    Prenatal Multivitamins with Folic Acid 1 mg oral tablet  -- 1 tab(s) by mouth once a day  -- Indication: For  vitamins

## 2020-12-27 NOTE — OB NEONATOLOGY/PEDIATRICIAN DELIVERY SUMMARY - NSPEDSNEONOTESA_OBGYN_ALL_OB_FT
Baby is a 34 wk GA female born to a 38 y/o [id number] mother via repeat C/S. Maternal history for prior C/s, didelphic uterus, ectopic. Prenatal history uncomplicated. Maternal BT B positive. RPR negative, Hbs, HIV, Rubella pending. GBS neg on 12/22. Intact no rupture. Baby born Nuchal x 1, WDSS, required CPAP6 at 4 min 30 seconds of life. Apgars 7/8. EOS 0.52. Temperature was 36.5. Admitted to NICU for prematurity. Mom plans to breastfeed, declines hepB. This is a 34 wk GA female born to a 38 y/o [id number], B+, prenatal labs unremarkable and Rubella pending, GS neg on 12/22 mother via repeat C/S. Maternal history for prior C/s, didelphic uterus, ectopic pregancy. Prenatal history uncomplicated. AROM at delivery. Nuchal cord x 1, W,D,S,S. CPAP 5 stated at 4 min and 30 seconds of life with increase to CPAP 6, 25 % for work of breathing and oxygen saturations. Apgars 7/8. EOS 0.52. Temperature was 36.5 prior to leaving LDR. Admitted to NICU for prematurity and respiratory distress. Mom plans to breastfeed, declines hepB.

## 2020-12-27 NOTE — OB PROVIDER H&P - HISTORY OF PRESENT ILLNESS
24 y/o pt 33.6 weeks  presents to triage with c/o of painful contractions every 5 minutes. pt reports 9/10 pain with contraction. pt also states one episode of vaginal spotting, dark blood on underwear. pt denies any recent vaginal exam or intercourse. pt denies any LOF, n/v/d, fever or chills. pt endorses +fetal movement.   AP complicated by:  -Didelphys uterus  -Admitted on - for vaginal bleeding; s/p Betamethasone course on 20 & 2020  Last PO @ 20:00    Allergy:   Penicillin- hives  Colchicine-hives  PMH:   Enlarged lymph node in lung- benign  PSH:   Right meniscus tear repeat 15'  Jaw surgery- maxillary screws in place   OB:   Primary  2009 breech 7#1  Repeat  10/07/2012 breech 7#11  Ectopic pregnancy 09' s/p methotrexate  GYN: denies  Social hx: denies  Medications: PNV

## 2020-12-28 ENCOUNTER — APPOINTMENT (OUTPATIENT)
Dept: OBGYN | Facility: CLINIC | Age: 35
End: 2020-12-28

## 2020-12-28 LAB
BASOPHILS # BLD AUTO: 0.02 K/UL — SIGNIFICANT CHANGE UP (ref 0–0.2)
BASOPHILS NFR BLD AUTO: 0.2 % — SIGNIFICANT CHANGE UP (ref 0–2)
EOSINOPHIL # BLD AUTO: 0.12 K/UL — SIGNIFICANT CHANGE UP (ref 0–0.5)
EOSINOPHIL NFR BLD AUTO: 1.2 % — SIGNIFICANT CHANGE UP (ref 0–6)
HCT VFR BLD CALC: 30 % — LOW (ref 34.5–45)
HGB BLD-MCNC: 9.8 G/DL — LOW (ref 11.5–15.5)
IANC: 7.77 K/UL — SIGNIFICANT CHANGE UP (ref 1.5–8.5)
IMM GRANULOCYTES NFR BLD AUTO: 0.6 % — SIGNIFICANT CHANGE UP (ref 0–1.5)
LYMPHOCYTES # BLD AUTO: 1.15 K/UL — SIGNIFICANT CHANGE UP (ref 1–3.3)
LYMPHOCYTES # BLD AUTO: 11.3 % — LOW (ref 13–44)
MCHC RBC-ENTMCNC: 28.6 PG — SIGNIFICANT CHANGE UP (ref 27–34)
MCHC RBC-ENTMCNC: 32.7 GM/DL — SIGNIFICANT CHANGE UP (ref 32–36)
MCV RBC AUTO: 87.5 FL — SIGNIFICANT CHANGE UP (ref 80–100)
MONOCYTES # BLD AUTO: 1.03 K/UL — HIGH (ref 0–0.9)
MONOCYTES NFR BLD AUTO: 10.1 % — SIGNIFICANT CHANGE UP (ref 2–14)
NEUTROPHILS # BLD AUTO: 7.77 K/UL — HIGH (ref 1.8–7.4)
NEUTROPHILS NFR BLD AUTO: 76.6 % — SIGNIFICANT CHANGE UP (ref 43–77)
NRBC # BLD: 0 /100 WBCS — SIGNIFICANT CHANGE UP
NRBC # FLD: 0 K/UL — SIGNIFICANT CHANGE UP
PLATELET # BLD AUTO: 154 K/UL — SIGNIFICANT CHANGE UP (ref 150–400)
RBC # BLD: 3.43 M/UL — LOW (ref 3.8–5.2)
RBC # FLD: 13.6 % — SIGNIFICANT CHANGE UP (ref 10.3–14.5)
RUBV IGG SER-ACNC: 7.8 INDEX — SIGNIFICANT CHANGE UP
RUBV IGG SER-ACNC: 7.8 INDEX — SIGNIFICANT CHANGE UP
RUBV IGG SER-IMP: POSITIVE — SIGNIFICANT CHANGE UP
RUBV IGG SER-IMP: POSITIVE — SIGNIFICANT CHANGE UP
T PALLIDUM AB TITR SER: NEGATIVE — SIGNIFICANT CHANGE UP
WBC # BLD: 10.15 K/UL — SIGNIFICANT CHANGE UP (ref 3.8–10.5)
WBC # FLD AUTO: 10.15 K/UL — SIGNIFICANT CHANGE UP (ref 3.8–10.5)

## 2020-12-28 RX ORDER — OXYCODONE HYDROCHLORIDE 5 MG/1
5 TABLET ORAL
Refills: 0 | Status: DISCONTINUED | OUTPATIENT
Start: 2020-12-28 | End: 2020-12-30

## 2020-12-28 RX ORDER — IBUPROFEN 200 MG
600 TABLET ORAL EVERY 6 HOURS
Refills: 0 | Status: DISCONTINUED | OUTPATIENT
Start: 2020-12-28 | End: 2020-12-30

## 2020-12-28 RX ORDER — FAMOTIDINE 10 MG/ML
0 INJECTION INTRAVENOUS
Qty: 0 | Refills: 0 | DISCHARGE

## 2020-12-28 RX ADMIN — OXYCODONE HYDROCHLORIDE 5 MILLIGRAM(S): 5 TABLET ORAL at 23:37

## 2020-12-28 RX ADMIN — HEPARIN SODIUM 5000 UNIT(S): 5000 INJECTION INTRAVENOUS; SUBCUTANEOUS at 05:52

## 2020-12-28 RX ADMIN — HEPARIN SODIUM 5000 UNIT(S): 5000 INJECTION INTRAVENOUS; SUBCUTANEOUS at 17:46

## 2020-12-28 RX ADMIN — Medication 975 MILLIGRAM(S): at 11:45

## 2020-12-28 RX ADMIN — Medication 975 MILLIGRAM(S): at 06:25

## 2020-12-28 RX ADMIN — Medication 975 MILLIGRAM(S): at 17:46

## 2020-12-28 RX ADMIN — OXYCODONE HYDROCHLORIDE 5 MILLIGRAM(S): 5 TABLET ORAL at 22:37

## 2020-12-28 RX ADMIN — Medication 975 MILLIGRAM(S): at 18:15

## 2020-12-28 RX ADMIN — Medication 975 MILLIGRAM(S): at 00:12

## 2020-12-28 RX ADMIN — Medication 975 MILLIGRAM(S): at 12:18

## 2020-12-28 RX ADMIN — Medication 975 MILLIGRAM(S): at 05:51

## 2020-12-28 NOTE — PROGRESS NOTE ADULT - SUBJECTIVE AND OBJECTIVE BOX
Day ___1 of Anesthesia Pain Management Service    SUBJECTIVE:  Pain Scale Score	At rest: __none_ 	With Activity: __mild_ 	[x ] Refer to charted pain scores    THERAPY:    s/p _____0.1___ mg PF morphine on _____5/22/17_    OBJECTIVE:    Sedation Score:	[ x] Alert	[ ] Drowsy	[ ] Arousable	[ ] Asleep	[ ] Unresponsive    Side Effects:	[x ] None	[ ] Nausea	[ ] Vomiting	[ ] Pruritus  		  [ ] Weakness		[ ] Numbness	[ ] Other:    Vital Signs Last 24 Hrs  T(C): 36.7 (28 Dec 2020 06:25), Max: 37 (27 Dec 2020 09:07)  T(F): 98.1 (28 Dec 2020 06:25), Max: 98.6 (27 Dec 2020 09:07)  HR: 88 (28 Dec 2020 06:25) (84 - 98)  BP: 119/61 (28 Dec 2020 06:25) (107/53 - 119/61)  BP(mean): --  RR: 16 (28 Dec 2020 06:25) (16 - 19)  SpO2: 100% (28 Dec 2020 06:25) (99% - 100%)    ASSESSMENT/ PLAN  [x ] Patient transitioned to prn analgesics  [ x] Pain management per primary service, pain service to sign off   [ x]Documentation and Verification of current medications     Comments: No anesthetic complications.

## 2020-12-28 NOTE — PROGRESS NOTE ADULT - ASSESSMENT
[facility] is a 34yo  [facility].

## 2020-12-28 NOTE — PROGRESS NOTE ADULT - PROBLEM SELECTOR PLAN 1
- appropriate drop in H/H   - continue with PO analgesia  - increase ambulation  - continue regular diet  - encourage incentive spirometry  - d/c IV fluids    Monika Rodriguez, PGY1

## 2020-12-28 NOTE — PROGRESS NOTE ADULT - SUBJECTIVE AND OBJECTIVE BOX
Patient seen and examined at bedside, no acute overnight events. No acute complaints, pain well controlled. Patient is ambulating and tolerating regular diet. Has not yet passed flatus. Denies CP, SOB, N/V, HA, blurred vision, epigastric pain.    Vital Signs Last 24 Hours  T(C): 36.7 (12-28-20 @ 06:25), Max: 37 (12-27-20 @ 09:07)  HR: 88 (12-28-20 @ 06:25) (84 - 98)  BP: 119/61 (12-28-20 @ 06:25) (107/53 - 119/61)  RR: 16 (12-28-20 @ 06:25) (16 - 19)  SpO2: 100% (12-28-20 @ 06:25) (99% - 100%)    I&O's Summary    27 Dec 2020 07:01  -  28 Dec 2020 07:00  --------------------------------------------------------  IN: 0 mL / OUT: 2200 mL / NET: -2200 mL        Physical Exam:  General: NAD  Abdomen: Soft, expected tenderness, non-distended, fundus firm  Incision: Pfannenstiel incision CDI, dermabond in place  Pelvic: Lochia wnl    Labs:    Blood Type: B Positive  Antibody Screen: Negative  Rubella IgG: Positive (Positive=Immune, Negative=Non-Immune)  RPR: Negative               9.8    10.15 )-----------( 154      ( 12-28 @ 06:47 )             30.0                11.3   11.51 )-----------( 174      ( 12-26 @ 23:39 )             34.2                10.1   8.35  )-----------( 151      ( 12-24 @ 07:07 )             32.2         MEDICATIONS  (STANDING):  acetaminophen   Tablet .. 975 milliGRAM(s) Oral <User Schedule>  diphtheria/tetanus/pertussis (acellular) Vaccine (ADAcel) 0.5 milliLiter(s) IntraMuscular once  ferrous    sulfate 325 milliGRAM(s) Oral daily  heparin   Injectable 5000 Unit(s) SubCutaneous every 12 hours  ibuprofen  Tablet. 600 milliGRAM(s) Oral every 6 hours  prenatal multivitamin 1 Tablet(s) Oral daily    MEDICATIONS  (PRN):  butorphanol Injectable 0.125 milliGRAM(s) IV Push every 6 hours PRN Pruritus  dexAMETHasone  Injectable 4 milliGRAM(s) IV Push every 6 hours PRN Nausea  diphenhydrAMINE 25 milliGRAM(s) Oral every 6 hours PRN Pruritus  lanolin Ointment 1 Application(s) Topical every 6 hours PRN Sore Nipples  magnesium hydroxide Suspension 30 milliLiter(s) Oral two times a day PRN Constipation  naloxone Injectable 0.1 milliGRAM(s) IV Push every 3 minutes PRN For ANY of the following changes in patient status:  A. Breaths Per Minute LESS THAN 10, B. Oxygen saturation LESS THAN 90%, C. Sedation score of 6 for Stop After: 4 Times  ondansetron Injectable 4 milliGRAM(s) IV Push every 6 hours PRN Nausea  oxyCODONE    IR 5 milliGRAM(s) Oral every 3 hours PRN Moderate to Severe Pain (4-10)  oxyCODONE    IR 5 milliGRAM(s) Oral once PRN Moderate to Severe Pain (4-10)  oxyCODONE    IR 5 milliGRAM(s) Oral every 3 hours PRN Mild Pain (1 - 3)  oxyCODONE    IR 10 milliGRAM(s) Oral every 3 hours PRN Moderate Pain (4 - 6)  simethicone 80 milliGRAM(s) Chew every 4 hours PRN Gas

## 2020-12-28 NOTE — PROGRESS NOTE ADULT - SUBJECTIVE AND OBJECTIVE BOX
Postop Day  __1_ s/p   C- Section    THERAPY:  [  ] Spinal morphine   (  ) mg  on (        )  [  ] Epidural morphine   [x  ] IV PCA Hydromorphone 1 mg/ml      Sedation Score:	  [ x ] Alert	    [  ] Drowsy        [  ] Arousable	[  ] Asleep	[  ] Unresponsive    Side Effects:	  [ x ] None	     [  ] Nausea        [  ] Pruritus        [  ] Weakness   [  ] Numbness        ASSESSMENT/ PLAN   [   ] Discontinue         [  ] Continue  [ x ]Documentation and Verification of current medications     Comments: Transitioned to prn oral analgesics by primary team. No anesthetic complication. Postop Day  __1_ s/p   C- Section    THERAPY:  [x  ] Spinal morphine   (  ) mg  on (        )  [  ] Epidural morphine   [ ] IV PCA Hydromorphone 1 mg/ml      Sedation Score:	  [ x ] Alert	    [  ] Drowsy        [  ] Arousable	[  ] Asleep	[  ] Unresponsive    Side Effects:	  [ x ] None	     [  ] Nausea        [  ] Pruritus        [  ] Weakness   [  ] Numbness        ASSESSMENT/ PLAN   [   ] Discontinue         [  ] Continue  [ x ]Documentation and Verification of current medications     Comments: Transitioned to prn oral analgesics by primary team. No anesthetic complication.

## 2020-12-29 RX ADMIN — Medication 975 MILLIGRAM(S): at 09:22

## 2020-12-29 RX ADMIN — HEPARIN SODIUM 5000 UNIT(S): 5000 INJECTION INTRAVENOUS; SUBCUTANEOUS at 06:21

## 2020-12-29 RX ADMIN — Medication 600 MILLIGRAM(S): at 23:14

## 2020-12-29 RX ADMIN — Medication 600 MILLIGRAM(S): at 22:44

## 2020-12-29 RX ADMIN — Medication 975 MILLIGRAM(S): at 09:56

## 2020-12-29 RX ADMIN — HEPARIN SODIUM 5000 UNIT(S): 5000 INJECTION INTRAVENOUS; SUBCUTANEOUS at 17:48

## 2020-12-29 NOTE — PROGRESS NOTE ADULT - SUBJECTIVE AND OBJECTIVE BOX
Patient assessed at 0747.  Subjective  35y      POD#2    repeat post-operative  section delivery for abnormal fetal status 33w6d.  Medical/Surgical/OB/GYN History of didelphys uterus, admitted - for vaginal bleeding s/p betamethasone,  sectionx2, ETOPx1, enlarged lymph node lung, right meniscus tear, jaw surgery. Patient reported having increase pain overnight that being relieved by pain management protocol. Patient denies any headache, blur vision, dizziness and/or weakness/fatigue. Out of bed ambulating passing flatus, negative bowel movement denies urge, voiding without difficulties.  Tolerating a regular diet. Patient using breast pump. Infant in the NICU.        Vitals  T(C): 36.8 (20 @ 06:00), Max: 36.8 (20 @ 06:00)  HR: 90 (20 @ 06:00) (89 - 95)  BP: 111/54 (20 @ 06:00) (104/60 - 120/67)  RR: 16 (20 @ 06:00) (16 - 18)  SpO2: 100% (20 @ 06:00) (100% - 100%)  Wt(kg): --                 LABS:                 9.8    10.15 )-----------( 154      ( 28 Dec 2020 06:47 )             30.0       Blood Type: B Positive    RPR: Negative    Rubella IgG: Positive (Positive=Immune, Negative=Non-Immune)    COVID-19 negative        MEDICATIONS  (STANDING):  acetaminophen   Tablet .. 975 milliGRAM(s) Oral <User Schedule>  diphtheria/tetanus/pertussis (acellular) Vaccine (ADAcel) 0.5 milliLiter(s) IntraMuscular once  ferrous    sulfate 325 milliGRAM(s) Oral daily  heparin   Injectable 5000 Unit(s) SubCutaneous every 12 hours  ibuprofen  Tablet. 600 milliGRAM(s) Oral every 6 hours  prenatal multivitamin 1 Tablet(s) Oral daily    MEDICATIONS  (PRN):  dexAMETHasone  Injectable 4 milliGRAM(s) IV Push every 6 hours PRN Nausea  diphenhydrAMINE 25 milliGRAM(s) Oral every 6 hours PRN Pruritus  lanolin Ointment 1 Application(s) Topical every 6 hours PRN Sore Nipples  magnesium hydroxide Suspension 30 milliLiter(s) Oral two times a day PRN Constipation  naloxone Injectable 0.1 milliGRAM(s) IV Push every 3 minutes PRN For ANY of the following changes in patient status:  A. Breaths Per Minute LESS THAN 10, B. Oxygen saturation LESS THAN 90%, C. Sedation score of 6 for Stop After: 4 Times  ondansetron Injectable 4 milliGRAM(s) IV Push every 6 hours PRN Nausea  oxyCODONE    IR 5 milliGRAM(s) Oral once PRN Moderate to Severe Pain (4-10)  oxyCODONE    IR 5 milliGRAM(s) Oral every 3 hours PRN Moderate to Severe Pain (4-10)  simethicone 80 milliGRAM(s) Chew every 4 hours PRN Gas

## 2020-12-29 NOTE — PROGRESS NOTE ADULT - ASSESSMENT
PE:  Lung sounds clear bilaterally. Normal heart rhythm.   Breasts: soft, nontender  Nipples: intact  Abdomen: Soft, nondistended and nontender. Bowel sounds present. Fundus firm  Abdominal incision: Dr and intact with dermabond. Old dry blood drainage noted. No active bleeding noted.   Vaginal: Lochia light rubra  Extremities: Slight edema noted bilaterally and equal to lower extremities, nontender with no erythremic areas noted. Positive pedal pulses. No palpable veins noted  Other relevant physical exam findings: none          Plan  A/P: 35y      Day#2    repeat post-operative  section delivery for abnormal fetal status. EBL 600cc.  Stable          Problem#1:  section delivery  Continue routine post-operative and postpartum care  Increase ambulation, analgesia PRN and pain medication protocol of standing ibuprofen and acetaminophen and oxycodone prn  Encouraged to wear abdominal binder for support and to use incentive spirometer.  Discussed abdominal incision care.   Discussed breast/nipple care, use of breast pump and breastfeeding.  Discussed discharge planning.  PE:  Lung sounds clear bilaterally. Normal heart rhythm.   Breasts: soft, nontender  Nipples: intact  Abdomen: Soft, nondistended and nontender. Bowel sounds present. Fundus firm  Abdominal incision: Dry and intact with dermabond. Old dry blood drainage noted. No active bleeding noted.   Vaginal: Lochia light rubra  Extremities: Slight edema noted bilaterally and equal to lower extremities, nontender with no erythremic areas noted. Positive pedal pulses. No palpable veins noted  Other relevant physical exam findings: none          Plan  A/P: 35y      Day#2    repeat post-operative  section delivery for abnormal fetal status. EBL 600cc.  Stable          Problem#1:  section delivery  Continue routine post-operative and postpartum care  Increase ambulation, analgesia PRN and pain medication protocol of standing ibuprofen and acetaminophen and oxycodone prn  Encouraged to wear abdominal binder for support and to use incentive spirometer.  Discussed abdominal incision care.   Discussed breast/nipple care, use of breast pump and breastfeeding.  Discussed discharge planning.

## 2020-12-29 NOTE — PROGRESS NOTE ADULT - ATTENDING COMMENTS
Patient seen and examined. Agree with above note. Patient is POD#2 s/p RCS @ 34wga for NRFHT. She is overall feeling well although she did have significant pain last night, likely related to overactivity. Encourage PRN oxycodone, around the clock Tylenol and NSAIDs.  Incision c/d/i. Anticipate POD#3 discharge.

## 2020-12-30 VITALS
DIASTOLIC BLOOD PRESSURE: 70 MMHG | SYSTOLIC BLOOD PRESSURE: 112 MMHG | RESPIRATION RATE: 19 BRPM | TEMPERATURE: 98 F | HEART RATE: 80 BPM | OXYGEN SATURATION: 99 %

## 2020-12-30 RX ORDER — ACETAMINOPHEN 500 MG
3 TABLET ORAL
Qty: 0 | Refills: 0 | DISCHARGE
Start: 2020-12-30

## 2020-12-30 RX ORDER — IBUPROFEN 200 MG
1 TABLET ORAL
Qty: 0 | Refills: 0 | DISCHARGE
Start: 2020-12-30

## 2020-12-30 RX ADMIN — HEPARIN SODIUM 5000 UNIT(S): 5000 INJECTION INTRAVENOUS; SUBCUTANEOUS at 06:38

## 2020-12-30 RX ADMIN — Medication 600 MILLIGRAM(S): at 12:45

## 2020-12-30 RX ADMIN — Medication 600 MILLIGRAM(S): at 13:20

## 2020-12-30 NOTE — PROGRESS NOTE ADULT - SUBJECTIVE AND OBJECTIVE BOX
Patient assessed at 0747.  Subjective  35y      POD#3    repeat post-operative  section delivery for abnormal fetal status 33w6d.  Medical/Surgical/OB/GYN History of didelphys uterus, admitted - for vaginal bleeding s/p betamethasone,  sectionx2, ETOPx1, enlarged lymph node lung, right meniscus tear, jaw surgery. Patient reports improvement in pain management from yesterday by pain management protocol. Patient denies any headache, blur vision, dizziness and/or weakness/fatigue. Out of bed ambulating passing flatus, positive bowel movement, voiding without difficulties.  Tolerating a regular diet. Patient using breast pump. Infant in the NICU.        Vital Signs Last 24 Hrs  T(C): 36.6 (30 Dec 2020 06:30), Max: 36.8 (29 Dec 2020 21:48)  T(F): 97.9 (30 Dec 2020 06:30), Max: 98.2 (29 Dec 2020 21:48)  HR: 75 (30 Dec 2020 06:30) (75 - 94)  BP: 108/58 (30 Dec 2020 06:30) (102/55 - 114/67)  BP(mean): --  RR: 18 (30 Dec 2020 06:30) (18 - 18)  SpO2: 100% (30 Dec 2020 06:30) (99% - 100%)                 LABS:                 9.8    10.15 )-----------( 154      ( 28 Dec 2020 06:47 )             30.0       Blood Type: B Positive    RPR: Negative    Rubella IgG: Positive (Positive=Immune, Negative=Non-Immune)    COVID-19 negative        MEDICATIONS  (STANDING):  acetaminophen   Tablet .. 975 milliGRAM(s) Oral <User Schedule>  diphtheria/tetanus/pertussis (acellular) Vaccine (ADAcel) 0.5 milliLiter(s) IntraMuscular once  ferrous    sulfate 325 milliGRAM(s) Oral daily  heparin   Injectable 5000 Unit(s) SubCutaneous every 12 hours  ibuprofen  Tablet. 600 milliGRAM(s) Oral every 6 hours  prenatal multivitamin 1 Tablet(s) Oral daily    MEDICATIONS  (PRN):  diphenhydrAMINE 25 milliGRAM(s) Oral every 6 hours PRN Pruritus  lanolin Ointment 1 Application(s) Topical every 6 hours PRN Sore Nipples  magnesium hydroxide Suspension 30 milliLiter(s) Oral two times a day PRN Constipation  oxyCODONE    IR 5 milliGRAM(s) Oral once PRN Moderate to Severe Pain (4-10)  oxyCODONE    IR 5 milliGRAM(s) Oral every 3 hours PRN Moderate to Severe Pain (4-10)  simethicone 80 milliGRAM(s) Chew every 4 hours PRN Gas               Patient assessed at 0722.  Subjective  35y      POD#3    repeat post-operative  section delivery for abnormal fetal status 33w6d.  Medical/Surgical/OB/GYN History of didelphys uterus, admitted - for vaginal bleeding s/p betamethasone,  sectionx2, ETOPx1, enlarged lymph node lung, right meniscus tear, jaw surgery. Patient reports improvement in pain management from yesterday by pain management protocol. Patient denies any headache, blur vision, dizziness and/or weakness/fatigue. Out of bed ambulating passing flatus, positive bowel movement, voiding without difficulties.  Tolerating a regular diet. Patient using breast pump. Infant in the NICU.        Vital Signs Last 24 Hrs  T(C): 36.6 (30 Dec 2020 06:30), Max: 36.8 (29 Dec 2020 21:48)  T(F): 97.9 (30 Dec 2020 06:30), Max: 98.2 (29 Dec 2020 21:48)  HR: 75 (30 Dec 2020 06:30) (75 - 94)  BP: 108/58 (30 Dec 2020 06:30) (102/55 - 114/67)  BP(mean): --  RR: 18 (30 Dec 2020 06:30) (18 - 18)  SpO2: 100% (30 Dec 2020 06:30) (99% - 100%)                 LABS:                 9.8    10.15 )-----------( 154      ( 28 Dec 2020 06:47 )             30.0       Blood Type: B Positive    RPR: Negative    Rubella IgG: Positive (Positive=Immune, Negative=Non-Immune)    COVID-19 negative        MEDICATIONS  (STANDING):  acetaminophen   Tablet .. 975 milliGRAM(s) Oral <User Schedule>  diphtheria/tetanus/pertussis (acellular) Vaccine (ADAcel) 0.5 milliLiter(s) IntraMuscular once  ferrous    sulfate 325 milliGRAM(s) Oral daily  heparin   Injectable 5000 Unit(s) SubCutaneous every 12 hours  ibuprofen  Tablet. 600 milliGRAM(s) Oral every 6 hours  prenatal multivitamin 1 Tablet(s) Oral daily    MEDICATIONS  (PRN):  diphenhydrAMINE 25 milliGRAM(s) Oral every 6 hours PRN Pruritus  lanolin Ointment 1 Application(s) Topical every 6 hours PRN Sore Nipples  magnesium hydroxide Suspension 30 milliLiter(s) Oral two times a day PRN Constipation  oxyCODONE    IR 5 milliGRAM(s) Oral once PRN Moderate to Severe Pain (4-10)  oxyCODONE    IR 5 milliGRAM(s) Oral every 3 hours PRN Moderate to Severe Pain (4-10)  simethicone 80 milliGRAM(s) Chew every 4 hours PRN Gas

## 2020-12-30 NOTE — PROGRESS NOTE ADULT - ASSESSMENT
PE:  Lung sounds clear bilaterally. Normal heart rhythm.   Breasts: slightly doss, nontender  Nipples: intact  Abdomen: Soft, nondistended and nontender. Bowel sounds present. Fundus firm  Abdominal incision: dry and intact with dermabond. Old dry blood drainage noted. No active bleeding noted.   Vaginal: Lochia light rubra  Extremities: Slight edema noted bilaterally and equal to lower extremities, nontender with no erythremic areas noted. Positive pedal pulses. No palpable veins noted  Other relevant physical exam findings: none          Plan  A/P: 35y      Day#3    repeat post-operative  section delivery for abnormal fetal status. EBL 600cc.  Stable          Problem#1:  section delivery  Continue routine post-operative and postpartum care  Increase ambulation, analgesia PRN and pain medication protocol of standing ibuprofen and acetaminophen and oxycodone prn  Encouraged to wear abdominal binder for support and to use incentive spirometer.  Discussed abdominal incision care.   Discussed breast/nipple/engorgement care, use of breast pump and breastfeeding.  Discharge to home today. Discussed discharge planning.

## 2021-01-05 ENCOUNTER — APPOINTMENT (OUTPATIENT)
Dept: OBGYN | Facility: CLINIC | Age: 36
End: 2021-01-05
Payer: MEDICARE

## 2021-01-05 PROCEDURE — 0503F POSTPARTUM CARE VISIT: CPT

## 2021-01-19 LAB — SURGICAL PATHOLOGY STUDY: SIGNIFICANT CHANGE UP

## 2021-02-23 ENCOUNTER — APPOINTMENT (OUTPATIENT)
Dept: OBGYN | Facility: CLINIC | Age: 36
End: 2021-02-23
Payer: MEDICARE

## 2021-02-23 PROCEDURE — 0503F POSTPARTUM CARE VISIT: CPT

## 2021-03-11 DIAGNOSIS — O09.293 SUPERVISION OF PREGNANCY WITH OTHER POOR REPRODUCTIVE OR OBSTETRIC HISTORY, THIRD TRIMESTER: ICD-10-CM

## 2021-03-11 DIAGNOSIS — O34.593 MATERNAL CARE FOR OTHER ABNORMALITIES OF GRAVID UTERUS, THIRD TRIMESTER: ICD-10-CM

## 2021-03-11 DIAGNOSIS — O43.93 UNSPECIFIED PLACENTAL DISORDER, THIRD TRIMESTER: ICD-10-CM

## 2021-03-11 DIAGNOSIS — O09.523 SUPERVISION OF ELDERLY MULTIGRAVIDA, THIRD TRIMESTER: ICD-10-CM

## 2021-03-11 DIAGNOSIS — O46.93 ANTEPARTUM HEMORRHAGE, UNSPECIFIED, THIRD TRIMESTER: ICD-10-CM

## 2022-03-15 ENCOUNTER — APPOINTMENT (OUTPATIENT)
Dept: OBGYN | Facility: CLINIC | Age: 37
End: 2022-03-15

## 2022-04-21 ENCOUNTER — APPOINTMENT (OUTPATIENT)
Dept: OBGYN | Facility: CLINIC | Age: 37
End: 2022-04-21
Payer: MEDICAID

## 2022-04-21 VITALS
BODY MASS INDEX: 28.56 KG/M2 | WEIGHT: 182 LBS | SYSTOLIC BLOOD PRESSURE: 110 MMHG | DIASTOLIC BLOOD PRESSURE: 68 MMHG | HEIGHT: 67 IN

## 2022-04-21 DIAGNOSIS — R70.0 ELEVATED ERYTHROCYTE SEDIMENTATION RATE: ICD-10-CM

## 2022-04-21 DIAGNOSIS — Z87.898 PERSONAL HISTORY OF OTHER SPECIFIED CONDITIONS: ICD-10-CM

## 2022-04-21 DIAGNOSIS — Z83.3 FAMILY HISTORY OF DIABETES MELLITUS: ICD-10-CM

## 2022-04-21 DIAGNOSIS — Z82.69 FAMILY HISTORY OF OTHER DISEASES OF THE MUSCULOSKELETAL SYSTEM AND CONNECTIVE TISSUE: ICD-10-CM

## 2022-04-21 DIAGNOSIS — R10.9 UNSPECIFIED ABDOMINAL PAIN: ICD-10-CM

## 2022-04-21 DIAGNOSIS — Z82.49 FAMILY HISTORY OF ISCHEMIC HEART DISEASE AND OTHER DISEASES OF THE CIRCULATORY SYSTEM: ICD-10-CM

## 2022-04-21 DIAGNOSIS — Z87.718 PERSONAL HISTORY OF OTHER SPECIFIED (CORRECTED) CONGENITAL MALFORMATIONS OF GENITOURINARY SYSTEM: ICD-10-CM

## 2022-04-21 DIAGNOSIS — R21 RASH AND OTHER NONSPECIFIC SKIN ERUPTION: ICD-10-CM

## 2022-04-21 DIAGNOSIS — R79.89 OTHER SPECIFIED ABNORMAL FINDINGS OF BLOOD CHEMISTRY: ICD-10-CM

## 2022-04-21 DIAGNOSIS — D86.9 SARCOIDOSIS, UNSPECIFIED: ICD-10-CM

## 2022-04-21 DIAGNOSIS — R59.0 LOCALIZED ENLARGED LYMPH NODES: ICD-10-CM

## 2022-04-21 DIAGNOSIS — M06.4 INFLAMMATORY POLYARTHROPATHY: ICD-10-CM

## 2022-04-21 PROCEDURE — 99395 PREV VISIT EST AGE 18-39: CPT

## 2022-04-22 LAB
ALBUMIN SERPL ELPH-MCNC: 4.7 G/DL
ALP BLD-CCNC: 45 U/L
ALT SERPL-CCNC: 14 U/L
ANION GAP SERPL CALC-SCNC: 14 MMOL/L
AST SERPL-CCNC: 16 U/L
BASOPHILS # BLD AUTO: 0.03 K/UL
BASOPHILS NFR BLD AUTO: 0.5 %
BILIRUB SERPL-MCNC: 0.5 MG/DL
BUN SERPL-MCNC: 8 MG/DL
C TRACH RRNA SPEC QL NAA+PROBE: NOT DETECTED
CALCIUM SERPL-MCNC: 9.2 MG/DL
CHLORIDE SERPL-SCNC: 103 MMOL/L
CO2 SERPL-SCNC: 22 MMOL/L
CREAT SERPL-MCNC: 0.64 MG/DL
EGFR: 116 ML/MIN/1.73M2
EOSINOPHIL # BLD AUTO: 0.1 K/UL
EOSINOPHIL NFR BLD AUTO: 1.7 %
ESTIMATED AVERAGE GLUCOSE: 91 MG/DL
GLUCOSE SERPL-MCNC: 92 MG/DL
HBA1C MFR BLD HPLC: 4.8 %
HCT VFR BLD CALC: 41.1 %
HGB BLD-MCNC: 13.2 G/DL
IMM GRANULOCYTES NFR BLD AUTO: 0.2 %
LYMPHOCYTES # BLD AUTO: 1.83 K/UL
LYMPHOCYTES NFR BLD AUTO: 31.9 %
MAN DIFF?: NORMAL
MCHC RBC-ENTMCNC: 27.6 PG
MCHC RBC-ENTMCNC: 32.1 GM/DL
MCV RBC AUTO: 86 FL
MONOCYTES # BLD AUTO: 0.61 K/UL
MONOCYTES NFR BLD AUTO: 10.6 %
N GONORRHOEA RRNA SPEC QL NAA+PROBE: NOT DETECTED
NEUTROPHILS # BLD AUTO: 3.16 K/UL
NEUTROPHILS NFR BLD AUTO: 55.1 %
PLATELET # BLD AUTO: 226 K/UL
POTASSIUM SERPL-SCNC: 4.4 MMOL/L
PROT SERPL-MCNC: 7.6 G/DL
RBC # BLD: 4.78 M/UL
RBC # FLD: 13.2 %
SODIUM SERPL-SCNC: 139 MMOL/L
SOURCE AMPLIFICATION: NORMAL
T4 FREE SERPL-MCNC: 1.2 NG/DL
TSH SERPL-ACNC: 2.51 UIU/ML
WBC # FLD AUTO: 5.74 K/UL

## 2022-04-26 LAB
25(OH)D3 SERPL-MCNC: 22.9 NG/ML
CHOLEST SERPL-MCNC: 199 MG/DL
CYTOLOGY CVX/VAG DOC THIN PREP: NORMAL
HDLC SERPL-MCNC: 60 MG/DL
HPV 16 E6+E7 MRNA CVX QL NAA+PROBE: NOT DETECTED
HPV HIGH+LOW RISK DNA PNL CVX: NOT DETECTED
HPV18+45 E6+E7 MRNA CVX QL NAA+PROBE: NOT DETECTED
LDLC SERPL CALC-MCNC: 125 MG/DL
NONHDLC SERPL-MCNC: 140 MG/DL
TRIGL SERPL-MCNC: 74 MG/DL

## 2022-06-05 NOTE — PLAN
[FreeTextEntry1] : \par 36 year old P3 presenting for annual exam. With regard to patient's breasts expressing milk, I educated patient that she stopped breast feeding relatively recently, and if she continues to squeeze or stimulate the breasts, they will continue to produce milk. With regard to discharge and tenderness at the end of patient's period, exam is normal and unofficial ultrasound today is unremarkable. Advised that nothing needs to be done at this time, but offered patient OCPs for shorter duration of bleeding. Patient states she has tried this in the past with no success, and declines.\par \par -breast examination normal, with no masses\par -normal didelphys uterus, only one cervix appreciated\par -f/u PAP and GC/CT done today\par -f/u routine blood work drawn today\par -contraception: condoms and withdrawal\par -f/u PRN\par \par =============================================\par I, Katharina Cast, acted solely as a scribe for Dr. Raleigh Tavares on Apr 21 2022  2:00PM. All medical entries made by the Scribe were at my, Dr. Raleigh Tavares's, direction and personally dictated by me on Apr 21 2022  2:00PM . I have reviewed the chart and agree that the record accurately reflects my personal performance of the history, physical exam, assessment, and plan. I have also personally directed, reviewed, and agreed with the chart.\par =============================================

## 2022-06-05 NOTE — PHYSICAL EXAM

## 2022-06-05 NOTE — HISTORY OF PRESENT ILLNESS
[Gonorrhea test offered] : Gonorrhea test offered [Chlamydia test offered] : Chlamydia test offered [HPV test offered] : HPV test offered [N] : Patient reports normal menses [Withdrawal] : uses withdrawal [Monogamous] : is monogamous [Y] : Positive pregnancy history [Currently Active] : currently active [Men] : men [No] : No [Yes] : Yes [Condoms] : Condoms [Patient refuses STI testing] : Patient refuses STI testing [LMPDate] : 04/10/22 [MensesFreq] : 28-29 [MensesAmount] : normal [PGHxTotal] : 3 [[facility]Term] : 3 [PGHxPremature] : 0 [PGHxAbortions] : 1 [Sierra Tucsoniving] : 3 [PGHxABInduced] : 0 [PGHxABSpont] : 1 [PGHxEctopic] : 0 [PGHxMultBirths] : 0 [FreeTextEntry1] :  x 3; SAB x 1 passed with Methotrexate

## 2022-06-08 NOTE — OB NEONATOLOGY/PEDIATRICIAN DELIVERY SUMMARY - NS_RESUSCITPROC_OBGYN_ALL_OB
Retention Suture Text: Retention sutures were placed to support the closure and prevent dehiscence. Nasal Suction/CPAP/Tactile Stimulation/Tracheal suctioning

## 2022-06-21 NOTE — OB PROVIDER H&P - NS_FHRLOC_OBGYN_ALL_OB
From: Sada Robledo  To: Franck Mayfield  Sent: 6/21/2022 12:07 PM CDT  Subject: Acid Reflux    Hi Dr Mayfield. I've been suffering from Acid Reflux/GERD daily for about 3 months now. I've changed my diet but it's not working. I'm going to see a specialist on Thursday. I was going to take omeprazole but read online that it can affect the absorption of levothyroxine. Thoughts on that? If it's recommended by the specialist, is it ok? Just want to be prepared. Thanks.   RLQ

## 2023-03-29 NOTE — OB RN TRIAGE NOTE - TEMPERATURE IN FAHRENHEIT (DEGREES F)
97.9
[FreeTextEntry1] : ORIGINAL PRESENTATION: Ms. Stanley is a 26 year-old woman complaining of neck, back, left leg pain sustained in a work related injury on January 28, 2022. 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 Her colleague was on a pallet mary which ran over her left lower leg. She fell onto the left arm, left hand, and started to develop pain to her neck and lower back. Patient has finished her sessions of physical therapy for her neck and lower back which provided no sustained relief. She is going to begin PT for her left leg. Neck pain radiates to her left arm. She states she has limited ROM when turning her neck left and right. It tends to crack when moving it. Back pain radiates downwards to left leg. Bending forward hurts more than backwards. Neck pain = Back pain but she would like to proceed with treatment of the neck first. She has difficulty eating. She also has migraines and tends to wake up with severe headaches. She is wearing a Cam boot on the left leg and left wrist brace. EMG was performed 2 days ago and does not have results with her. She takes cyclobenzaprine and ibuprofen for pain. \par \par The pain started after an injury at work. Patient describes pain as severe rated at a 10/10. During the last month the pain has been constant with symptoms worsening in no typical pattern. Pain described as burning, sharp, pressure-like, cramping, dull aching, throbbing, shooting or cutting. Pain is associated with numbness/pins and needles. Pain is increased with lying down, standing, sitting, walking, exercise, relaxation, coughing sneezing or bowel movements. Bowel or bladder habits have changed.\par \par ACTIVITIES: Patient could walk 0 blocks before the pain starts. Patient can stand 15-20 minutes before pain starts. The patient often, constantly lies down because of pain. Patient uses no assisted walking device at this time. Patient has difficulty performing household chores, going to work, doing yardwork or shopping, socializing with friends, participating in recreational activities or exercising at this time.\par \par PRIOR PAIN TREATMENTS: Moderate relief with psychotherapy. \par \par Prior Pain Medications: Tylenol\par \par PATIENT PRESENTS FOR FOLLOW UP: She is currently under our care for neck, back, and left leg pain which she is receiving active care for.  Her neck has been her main source of pain for the last few months and she states it has gradually worsened. She is unable to preform her ADL's and work around the house due to her pain. She says she has been reliant on her  to preform her daily tasks. \par \par She is on a current medication regimen of Cyclobenzaprine HCL 10mg thee times daily and Ibuprofen 600mg three times daily from Dr. Lewis. This regimen provides her with achieve 30% relief and helps her get adequate sleep. She is interested in trying medical marijuana and would like to trial it over injection therapy at this time. \par

## 2023-05-02 ENCOUNTER — APPOINTMENT (OUTPATIENT)
Dept: OBGYN | Facility: CLINIC | Age: 38
End: 2023-05-02
Payer: MEDICAID

## 2023-05-02 VITALS — SYSTOLIC BLOOD PRESSURE: 105 MMHG | WEIGHT: 172 LBS | DIASTOLIC BLOOD PRESSURE: 72 MMHG | BODY MASS INDEX: 26.94 KG/M2

## 2023-05-02 PROCEDURE — 99395 PREV VISIT EST AGE 18-39: CPT

## 2023-05-02 NOTE — HISTORY OF PRESENT ILLNESS
[Y] : Positive pregnancy history [Currently Active] : currently active [Men] : men [No] : No [Yes] : Yes [Condoms] : Condoms [PGHxTotal] : 3 [[facility]Term] : 3 [[facility] Centeriving] : 3 [FreeTextEntry1] : c/s x3

## 2023-05-02 NOTE — PLAN
[FreeTextEntry1] : 37 year old P3 presenting for annual exam.\par -uterus is normal sized and mobile\par -f/u PAP and GC/CT done today\par -f/u CBC,CMP, vitamin D, vitamin B12, thyroid bw, A1C \par -contraception: condoms, no iud or ocp d/t didelphys uterus\par -pt to start monitoring menses as she has long cycles \par -f/u PRN

## 2023-05-04 ENCOUNTER — NON-APPOINTMENT (OUTPATIENT)
Age: 38
End: 2023-05-04

## 2023-05-04 LAB
25(OH)D3 SERPL-MCNC: 24 NG/ML
ALBUMIN SERPL ELPH-MCNC: 4.5 G/DL
ALP BLD-CCNC: 41 U/L
ALT SERPL-CCNC: 23 U/L
ANION GAP SERPL CALC-SCNC: 14 MMOL/L
AST SERPL-CCNC: 22 U/L
BASOPHILS # BLD AUTO: 0.05 K/UL
BASOPHILS NFR BLD AUTO: 0.7 %
BILIRUB SERPL-MCNC: 0.4 MG/DL
BUN SERPL-MCNC: 12 MG/DL
C TRACH RRNA SPEC QL NAA+PROBE: NOT DETECTED
CALCIUM SERPL-MCNC: 9.3 MG/DL
CHLORIDE SERPL-SCNC: 106 MMOL/L
CO2 SERPL-SCNC: 23 MMOL/L
CREAT SERPL-MCNC: 0.73 MG/DL
EGFR: 107 ML/MIN/1.73M2
EOSINOPHIL # BLD AUTO: 0.14 K/UL
EOSINOPHIL NFR BLD AUTO: 1.9 %
GLUCOSE SERPL-MCNC: 95 MG/DL
HCT VFR BLD CALC: 36.9 %
HGB BLD-MCNC: 11 G/DL
HPV 16 E6+E7 MRNA CVX QL NAA+PROBE: NOT DETECTED
HPV HIGH+LOW RISK DNA PNL CVX: NOT DETECTED
HPV18+45 E6+E7 MRNA CVX QL NAA+PROBE: NOT DETECTED
IMM GRANULOCYTES NFR BLD AUTO: 0.1 %
LYMPHOCYTES # BLD AUTO: 2.02 K/UL
LYMPHOCYTES NFR BLD AUTO: 28.1 %
MAN DIFF?: NORMAL
MCHC RBC-ENTMCNC: 24.6 PG
MCHC RBC-ENTMCNC: 29.8 GM/DL
MCV RBC AUTO: 82.4 FL
MONOCYTES # BLD AUTO: 0.58 K/UL
MONOCYTES NFR BLD AUTO: 8.1 %
N GONORRHOEA RRNA SPEC QL NAA+PROBE: NOT DETECTED
NEUTROPHILS # BLD AUTO: 4.4 K/UL
NEUTROPHILS NFR BLD AUTO: 61.1 %
PLATELET # BLD AUTO: 248 K/UL
POTASSIUM SERPL-SCNC: 4.8 MMOL/L
PROT SERPL-MCNC: 7.5 G/DL
RBC # BLD: 4.48 M/UL
RBC # FLD: 14.6 %
SODIUM SERPL-SCNC: 143 MMOL/L
SOURCE AMPLIFICATION: NORMAL
T4 FREE SERPL-MCNC: 1.1 NG/DL
TSH SERPL-ACNC: 4.86 UIU/ML
VIT B12 SERPL-MCNC: 672 PG/ML
WBC # FLD AUTO: 7.2 K/UL

## 2023-05-08 LAB — CYTOLOGY CVX/VAG DOC THIN PREP: NORMAL

## 2023-12-16 NOTE — OB RN DELIVERY SUMMARY - AMNIOTIC FLUID AMOUNT, LABOR
Alert-The patient is alert, awake and responds to voice. The patient is oriented to time, place, and person. The triage nurse is able to obtain subjective information. within normal limits

## 2023-12-30 NOTE — OB PROVIDER H&P - NSTRANFUSIONOBJECTION_GEN_ALL_CORE_SIUH
Patient sleeping comfortably, resp even and unlabored, NAD, and no needs at this time.  Patient  on 1:1 observation with sitter in LOS, room safety complete, checklist in place, and STOP sign posted outside door.    Patient has no objection to blood transfusions.

## 2024-01-19 ENCOUNTER — LABORATORY RESULT (OUTPATIENT)
Age: 39
End: 2024-01-19

## 2024-03-17 ENCOUNTER — NON-APPOINTMENT (OUTPATIENT)
Age: 39
End: 2024-03-17

## 2024-03-17 ENCOUNTER — APPOINTMENT (OUTPATIENT)
Dept: INTERNAL MEDICINE | Facility: CLINIC | Age: 39
End: 2024-03-17
Payer: MEDICAID

## 2024-03-17 VITALS
SYSTOLIC BLOOD PRESSURE: 100 MMHG | OXYGEN SATURATION: 97 % | WEIGHT: 181 LBS | HEIGHT: 67 IN | BODY MASS INDEX: 28.41 KG/M2 | DIASTOLIC BLOOD PRESSURE: 67 MMHG | HEART RATE: 81 BPM

## 2024-03-17 DIAGNOSIS — Z86.2 PERSONAL HISTORY OF DISEASES OF THE BLOOD AND BLOOD-FORMING ORGANS AND CERTAIN DISORDERS INVOLVING THE IMMUNE MECHANISM: ICD-10-CM

## 2024-03-17 DIAGNOSIS — R10.30 LOWER ABDOMINAL PAIN, UNSPECIFIED: ICD-10-CM

## 2024-03-17 DIAGNOSIS — Z12.31 ENCOUNTER FOR SCREENING MAMMOGRAM FOR MALIGNANT NEOPLASM OF BREAST: ICD-10-CM

## 2024-03-17 DIAGNOSIS — Z00.00 ENCOUNTER FOR GENERAL ADULT MEDICAL EXAMINATION W/OUT ABNORMAL FINDINGS: ICD-10-CM

## 2024-03-17 DIAGNOSIS — R42 DIZZINESS AND GIDDINESS: ICD-10-CM

## 2024-03-17 DIAGNOSIS — Z13.29 ENCOUNTER FOR SCREENING FOR OTHER SUSPECTED ENDOCRINE DISORDER: ICD-10-CM

## 2024-03-17 DIAGNOSIS — Z11.1 ENCOUNTER FOR SCREENING FOR RESPIRATORY TUBERCULOSIS: ICD-10-CM

## 2024-03-17 DIAGNOSIS — D50.8 OTHER IRON DEFICIENCY ANEMIAS: ICD-10-CM

## 2024-03-17 DIAGNOSIS — R53.83 OTHER FATIGUE: ICD-10-CM

## 2024-03-17 DIAGNOSIS — E55.9 VITAMIN D DEFICIENCY, UNSPECIFIED: ICD-10-CM

## 2024-03-17 DIAGNOSIS — Z02.1 ENCOUNTER FOR PRE-EMPLOYMENT EXAMINATION: ICD-10-CM

## 2024-03-17 DIAGNOSIS — K21.9 GASTRO-ESOPHAGEAL REFLUX DISEASE W/OUT ESOPHAGITIS: ICD-10-CM

## 2024-03-17 PROCEDURE — 93000 ELECTROCARDIOGRAM COMPLETE: CPT

## 2024-03-17 PROCEDURE — 99386 PREV VISIT NEW AGE 40-64: CPT

## 2024-03-17 RX ORDER — PANTOPRAZOLE 40 MG/1
40 TABLET, DELAYED RELEASE ORAL
Qty: 90 | Refills: 1 | Status: ACTIVE | COMMUNITY
Start: 2024-03-17 | End: 1900-01-01

## 2024-03-17 RX ORDER — CLOBETASOL PROPIONATE 0.5 MG/G
0.05 OINTMENT TOPICAL TWICE DAILY
Qty: 1 | Refills: 2 | Status: ACTIVE | COMMUNITY
Start: 2024-03-17 | End: 1900-01-01

## 2024-03-17 RX ORDER — LEVOCETIRIZINE DIHYDROCHLORIDE 5 MG/1
5 TABLET ORAL
Qty: 30 | Refills: 5 | Status: ACTIVE | COMMUNITY
Start: 2024-03-17 | End: 1900-01-01

## 2024-03-17 RX ORDER — IBUPROFEN 600 MG/1
600 TABLET, FILM COATED ORAL 3 TIMES DAILY
Qty: 40 | Refills: 0 | Status: ACTIVE | COMMUNITY
Start: 2024-03-17 | End: 1900-01-01

## 2024-03-17 RX ORDER — BENZONATATE 200 MG/1
200 CAPSULE ORAL 3 TIMES DAILY
Qty: 42 | Refills: 0 | Status: ACTIVE | COMMUNITY
Start: 2024-03-17 | End: 1900-01-01

## 2024-03-17 NOTE — HISTORY OF PRESENT ILLNESS
[FreeTextEntry1] : Annual physical exam [de-identified] : Patient is 38 year female  with PMH of  Anemia, Sarcoidosis , came today for annual physical exam C/o dry cough Qam for 1 month Lower abdominal pain due to heavy menstrual period C/o post nasal drip due to Seasonal Allergy

## 2024-03-17 NOTE — HEALTH RISK ASSESSMENT
[Good] : ~his/her~ current health as good [Yes] : Yes [Monthly or less (1 pt)] : Monthly or less (1 point) [1 or 2 (0 pts)] : 1 or 2 (0 points) [Never (0 pts)] : Never (0 points) [No] : In the past 12 months have you used drugs other than those required for medical reasons? No [No falls in past year] : Patient reported no falls in the past year [Little interest or pleasure doing things] : 1) Little interest or pleasure doing things [Feeling down, depressed, or hopeless] : 2) Feeling down, depressed, or hopeless [0] : 2) Feeling down, depressed, or hopeless: Not at all (0) [PHQ-2 Negative - No further assessment needed] : PHQ-2 Negative - No further assessment needed [No Retinopathy] : No retinopathy [HIV test declined] : HIV test declined [Patient reported PAP Smear was normal] : Patient reported PAP Smear was normal [Hepatitis C test offered] : Hepatitis C test offered [None] : None [] :  [Sexually Active] : sexually active [# Of Children ___] : has [unfilled] children [Fully functional (bathing, dressing, toileting, transferring, walking, feeding)] : Fully functional (bathing, dressing, toileting, transferring, walking, feeding) [Feels Safe at Home] : Feels safe at home [Fully functional (using the telephone, shopping, preparing meals, housekeeping, doing laundry, using] : Fully functional and needs no help or supervision to perform IADLs (using the telephone, shopping, preparing meals, housekeeping, doing laundry, using transportation, managing medications and managing finances) [Carbon Monoxide Detector] : carbon monoxide detector [Smoke Detector] : smoke detector [Sunscreen] : uses sunscreen [Seat Belt] :  uses seat belt [Aggressive treatment] : aggressive treatment [Time Spent: ___ minutes] : Time Spent: [unfilled] minutes [Never] : Never [CJD2Gduon] : 0 [EyeExamDate] : 02/2024 [Change in mental status noted] : No change in mental status noted [Reports changes in hearing] : Reports no changes in hearing [Reports changes in vision] : Reports no changes in vision [PapSmearDate] : 05/2023 [AdvancecareDate] : 03/17/2024

## 2024-03-17 NOTE — COUNSELING
[Fall prevention counseling provided] : Fall prevention counseling provided [Use proper foot wear] : Use proper foot wear [Adequate lighting] : Adequate lighting [Behavioral health counseling provided] : Behavioral health counseling provided [Potential consequences of obesity discussed] : Potential consequences of obesity discussed [Engage in a relaxing activity] : Engage in a relaxing activity [Weigh Self Weekly] : weigh self weekly [Benefits of weight loss discussed] : Benefits of weight loss discussed [Decrease Portions] : decrease portions [Keep Food Diary] : keep food diary

## 2024-03-18 DIAGNOSIS — L23.9 ALLERGIC CONTACT DERMATITIS, UNSPECIFIED CAUSE: ICD-10-CM

## 2024-03-18 LAB
25(OH)D3 SERPL-MCNC: 19.4 NG/ML
ALBUMIN SERPL ELPH-MCNC: 4.4 G/DL
ALP BLD-CCNC: 40 U/L
ALT SERPL-CCNC: 14 U/L
ANION GAP SERPL CALC-SCNC: 10 MMOL/L
AST SERPL-CCNC: 16 U/L
BILIRUB SERPL-MCNC: 0.3 MG/DL
BUN SERPL-MCNC: 10 MG/DL
CALCIUM SERPL-MCNC: 9 MG/DL
CHLORIDE SERPL-SCNC: 105 MMOL/L
CHOLEST SERPL-MCNC: 182 MG/DL
CO2 SERPL-SCNC: 25 MMOL/L
CREAT SERPL-MCNC: 0.64 MG/DL
EGFR: 114 ML/MIN/1.73M2
FOLATE SERPL-MCNC: 6.5 NG/ML
GLUCOSE SERPL-MCNC: 89 MG/DL
HBV SURFACE AB SER QL: NONREACTIVE
HBV SURFACE AG SER QL: NONREACTIVE
HCT VFR BLD CALC: 35.3 %
HCV AB SER QL: NONREACTIVE
HCV S/CO RATIO: 0.19 S/CO
HDLC SERPL-MCNC: 58 MG/DL
HGB BLD-MCNC: 10.5 G/DL
IRON SERPL-MCNC: 24 UG/DL
LDLC SERPL CALC-MCNC: 107 MG/DL
MCHC RBC-ENTMCNC: 23.7 PG
MCHC RBC-ENTMCNC: 29.7 GM/DL
MCV RBC AUTO: 79.7 FL
NONHDLC SERPL-MCNC: 124 MG/DL
PLATELET # BLD AUTO: 245 K/UL
POTASSIUM SERPL-SCNC: 4.4 MMOL/L
PROT SERPL-MCNC: 7.5 G/DL
RBC # BLD: 4.43 M/UL
RBC # FLD: 16.1 %
SODIUM SERPL-SCNC: 140 MMOL/L
TRIGL SERPL-MCNC: 89 MG/DL
TSH SERPL-ACNC: 3.04 UIU/ML
VIT B12 SERPL-MCNC: 825 PG/ML
WBC # FLD AUTO: 6.14 K/UL

## 2024-03-18 RX ORDER — CETIRIZINE HYDROCHLORIDE 10 MG/1
10 TABLET, COATED ORAL
Qty: 30 | Refills: 5 | Status: ACTIVE | COMMUNITY
Start: 2024-03-18 | End: 1900-01-01

## 2024-03-20 LAB
M TB IFN-G BLD-IMP: NEGATIVE
MEV IGG FLD QL IA: 95.1 AU/ML
MEV IGG+IGM SER-IMP: POSITIVE
MUV AB SER-ACNC: POSITIVE
MUV IGG SER QL IA: 167 AU/ML
QUANTIFERON TB PLUS MITOGEN MINUS NIL: 9.65 IU/ML
QUANTIFERON TB PLUS NIL: 0.06 IU/ML
QUANTIFERON TB PLUS TB1 MINUS NIL: 0.02 IU/ML
QUANTIFERON TB PLUS TB2 MINUS NIL: 0 IU/ML
RUBV IGG FLD-ACNC: 5.9 INDEX
RUBV IGG SER-IMP: POSITIVE
VZV AB TITR SER: POSITIVE
VZV IGG SER IF-ACNC: 3377 INDEX

## 2024-04-09 LAB
AMPHET UR-MCNC: NEGATIVE NG/ML
BARBITURATES UR-MCNC: NEGATIVE NG/ML
BENZODIAZ UR-MCNC: NEGATIVE NG/ML
COCAINE METAB.OTHER UR-MCNC: NEGATIVE NG/ML
CREATININE, URINE: 64.9 MG/DL
FENTANYL, URINE: NEGATIVE NG/ML
METHADONE UR-MCNC: NEGATIVE NG/ML
OPIATES UR-MCNC: NEGATIVE NG/ML
OXYCODONE/OXYMORPHONE, URINE: NEGATIVE NG/ML
PCP UR-MCNC: NEGATIVE NG/ML
PH, URINE: 5
PLEASE NOTE: DRUGSCRUR: NORMAL
THC UR QL: NEGATIVE NG/ML

## 2024-04-17 DIAGNOSIS — J06.9 ACUTE UPPER RESPIRATORY INFECTION, UNSPECIFIED: ICD-10-CM

## 2024-04-17 RX ORDER — OSELTAMIVIR PHOSPHATE 75 MG/1
75 CAPSULE ORAL TWICE DAILY
Qty: 10 | Refills: 0 | Status: ACTIVE | COMMUNITY
Start: 2024-04-17 | End: 1900-01-01

## 2024-06-11 ENCOUNTER — APPOINTMENT (OUTPATIENT)
Dept: OBGYN | Facility: CLINIC | Age: 39
End: 2024-06-11
Payer: MEDICAID

## 2024-06-11 VITALS — SYSTOLIC BLOOD PRESSURE: 111 MMHG | BODY MASS INDEX: 29.6 KG/M2 | WEIGHT: 189 LBS | DIASTOLIC BLOOD PRESSURE: 74 MMHG

## 2024-06-11 DIAGNOSIS — Z01.419 ENCOUNTER FOR GYNECOLOGICAL EXAMINATION (GENERAL) (ROUTINE) W/OUT ABNORMAL FINDINGS: ICD-10-CM

## 2024-06-11 PROCEDURE — 99396 PREV VISIT EST AGE 40-64: CPT

## 2024-06-12 LAB — HPV HIGH+LOW RISK DNA PNL CVX: NOT DETECTED

## 2024-06-16 LAB
C TRACH RRNA SPEC QL NAA+PROBE: NOT DETECTED
CYTOLOGY CVX/VAG DOC THIN PREP: NORMAL
HPV 16 E6+E7 MRNA CVX QL NAA+PROBE: NOT DETECTED
HPV18+45 E6+E7 MRNA CVX QL NAA+PROBE: NOT DETECTED
N GONORRHOEA RRNA SPEC QL NAA+PROBE: NOT DETECTED
SOURCE AMPLIFICATION: NORMAL

## 2024-06-18 NOTE — HISTORY OF PRESENT ILLNESS
[N] : Patient reports normal menses [Y] : Positive pregnancy history [Currently Active] : currently active [Men] : men [No] : No [Yes] : Yes [Condoms] : Condoms [LMPDate] : 5/22/24 [PGHxTotal] : 3 [[facility]Term] : 3 [[facility] Centeriving] : 3 [FreeTextEntry1] : C/S x3

## 2024-06-18 NOTE — PLAN
[FreeTextEntry1] : 38 year old P3 presenting for annual exam. -uterus is normal sized and mobile -f/u PAP and GC/CT done today -requisition given for mammogram and breast sono  -contraception: condoms, no iud or ocp d/t didelphys uterus -f/u PRN

## 2025-01-02 RX ORDER — AZITHROMYCIN 250 MG/1
250 TABLET, FILM COATED ORAL
Qty: 1 | Refills: 0 | Status: ACTIVE | COMMUNITY
Start: 2025-01-02 | End: 1900-01-01

## 2025-01-07 DIAGNOSIS — J06.9 ACUTE UPPER RESPIRATORY INFECTION, UNSPECIFIED: ICD-10-CM

## 2025-01-07 RX ORDER — PREDNISONE 20 MG/1
20 TABLET ORAL DAILY
Qty: 7 | Refills: 0 | Status: ACTIVE | COMMUNITY
Start: 2025-01-07 | End: 1900-01-01

## 2025-04-22 DIAGNOSIS — E55.9 VITAMIN D DEFICIENCY, UNSPECIFIED: ICD-10-CM

## 2025-04-22 DIAGNOSIS — D50.8 OTHER IRON DEFICIENCY ANEMIAS: ICD-10-CM

## 2025-04-22 DIAGNOSIS — R53.83 OTHER FATIGUE: ICD-10-CM

## 2025-04-22 DIAGNOSIS — Z11.1 ENCOUNTER FOR SCREENING FOR RESPIRATORY TUBERCULOSIS: ICD-10-CM

## 2025-04-22 DIAGNOSIS — Z02.1 ENCOUNTER FOR PRE-EMPLOYMENT EXAMINATION: ICD-10-CM

## 2025-04-23 LAB
HCT VFR BLD CALC: 31.4 %
HGB BLD-MCNC: 9.2 G/DL
MCHC RBC-ENTMCNC: 21.3 PG
MCHC RBC-ENTMCNC: 29.3 G/DL
MCV RBC AUTO: 72.7 FL
PLATELET # BLD AUTO: 293 K/UL
RBC # BLD: 4.32 M/UL
RBC # FLD: 17.2 %
WBC # FLD AUTO: 7.47 K/UL

## 2025-04-24 DIAGNOSIS — D64.9 ANEMIA, UNSPECIFIED: ICD-10-CM

## 2025-04-24 LAB
ALBUMIN SERPL ELPH-MCNC: 4.2 G/DL
ALP BLD-CCNC: 47 U/L
ALT SERPL-CCNC: 16 U/L
ANION GAP SERPL CALC-SCNC: 14 MMOL/L
AST SERPL-CCNC: 20 U/L
BILIRUB SERPL-MCNC: 0.3 MG/DL
BUN SERPL-MCNC: 12 MG/DL
CALCIUM SERPL-MCNC: 8.7 MG/DL
CHLORIDE SERPL-SCNC: 105 MMOL/L
CHOLEST SERPL-MCNC: 180 MG/DL
CO2 SERPL-SCNC: 19 MMOL/L
CREAT SERPL-MCNC: 0.74 MG/DL
EGFRCR SERPLBLD CKD-EPI 2021: 104 ML/MIN/1.73M2
FOLATE SERPL-MCNC: 4.1 NG/ML
GLUCOSE SERPL-MCNC: 95 MG/DL
HBV SURFACE AB SER QL: NONREACTIVE
HBV SURFACE AG SER QL: NONREACTIVE
HCV AB SER QL: NONREACTIVE
HCV S/CO RATIO: 0.26 S/CO
HDLC SERPL-MCNC: 57 MG/DL
IRON SERPL-MCNC: 28 UG/DL
LDLC SERPL-MCNC: 112 MG/DL
NONHDLC SERPL-MCNC: 123 MG/DL
POTASSIUM SERPL-SCNC: 4.7 MMOL/L
PROT SERPL-MCNC: 7.1 G/DL
SODIUM SERPL-SCNC: 138 MMOL/L
TRIGL SERPL-MCNC: 60 MG/DL
TSH SERPL-ACNC: 3.17 UIU/ML
VIT B12 SERPL-MCNC: 733 PG/ML

## 2025-04-24 RX ORDER — FOLIC ACID 1 MG/1
1 TABLET ORAL DAILY
Qty: 90 | Refills: 1 | Status: ACTIVE | COMMUNITY
Start: 2025-04-24 | End: 1900-01-01

## 2025-04-25 LAB
M TB IFN-G BLD-IMP: NEGATIVE
QUANTIFERON TB PLUS MITOGEN MINUS NIL: 3.21 IU/ML
QUANTIFERON TB PLUS NIL: 0.04 IU/ML
QUANTIFERON TB PLUS TB1 MINUS NIL: 0 IU/ML
QUANTIFERON TB PLUS TB2 MINUS NIL: 0 IU/ML

## 2025-04-25 NOTE — OB PROVIDER TRIAGE NOTE - NSOBPROVIDERNOTE_OBGYN_ALL_OB_FT
24-Apr-2025 07:53 25-Apr-2025 15:34 24-Apr-2025 01:16 35 y.o. White patient  BATSHEVA 2021 @ 33.2 weeks presents with c/o vaginal bleeding since 6am. Pt states first episode this pregnancy. Pt states "there was blood on underwear and clots in the toilet." Pt denies ctx/abd pain. States +FM. Pt has didelphic uterus with pregnancy in left uterus.    allergies:  PCN - rash  mitigare/colchicine - rash    med/surg hx:   jaw symmetry, maxillary screws placed   right knee meniscus sx  enlarged lymph node in the lung    OBGYN hx:  2009 primary cs 2/2 breech presentation 7lbs 1 oz F  10/7/2012 repeat cs 7lbs 11 oz M   ectopic pregnancy treated with methotrexate    abdomen soft, nontender  taus: sliup, posterior placenta, bpp 8/8, maddie 10.8, efw 1854 grams  sse: no active bleeding noted, cervix appears closed, 5 mL blood staining in vaginal vault  sve: 0/0/-3/I  nst reactive  toco: no ctx noted    UA, cbc, fibrinogen, pt, ptt, type and screen pending    Discussed with Dr Mendoza and Dr Tavares. Pt admitted for betamethasone course and observation    see h+p    Isabel NP

## 2025-05-02 LAB
AMPHET UR-MCNC: NEGATIVE NG/ML
BARBITURATES UR-MCNC: NEGATIVE NG/ML
BENZODIAZ UR-MCNC: NEGATIVE NG/ML
COCAINE METAB.OTHER UR-MCNC: NEGATIVE NG/ML
CREATININE, URINE: 45.6 MG/DL
FENTANYL, URINE: NEGATIVE NG/ML
METHADONE SCREEN, UR: NEGATIVE NG/ML
OPIATES UR-MCNC: NEGATIVE NG/ML
OXYCODONE/OXYMORPHONE, URINE: NEGATIVE NG/ML
PCP UR-MCNC: NEGATIVE NG/ML
PH, URINE: 5.3
THC UR QL: NEGATIVE NG/ML

## 2025-06-30 ENCOUNTER — APPOINTMENT (OUTPATIENT)
Dept: OBGYN | Facility: CLINIC | Age: 40
End: 2025-06-30
Payer: MEDICAID

## 2025-06-30 VITALS
DIASTOLIC BLOOD PRESSURE: 70 MMHG | HEIGHT: 67 IN | BODY MASS INDEX: 45.99 KG/M2 | SYSTOLIC BLOOD PRESSURE: 103 MMHG | WEIGHT: 293 LBS

## 2025-06-30 PROCEDURE — 99386 PREV VISIT NEW AGE 40-64: CPT

## 2025-06-30 PROCEDURE — 99459 PELVIC EXAMINATION: CPT

## 2025-07-02 LAB
CYTOLOGY CVX/VAG DOC THIN PREP: NORMAL
HPV HIGH+LOW RISK DNA PNL CVX: NOT DETECTED

## 2025-07-18 ENCOUNTER — APPOINTMENT (OUTPATIENT)
Dept: ANTEPARTUM | Facility: CLINIC | Age: 40
End: 2025-07-18
Payer: MEDICAID

## 2025-07-18 ENCOUNTER — ASOB RESULT (OUTPATIENT)
Age: 40
End: 2025-07-18

## 2025-07-18 ENCOUNTER — APPOINTMENT (OUTPATIENT)
Dept: OBGYN | Facility: CLINIC | Age: 40
End: 2025-07-18
Payer: MEDICAID

## 2025-07-18 VITALS — WEIGHT: 183 LBS | SYSTOLIC BLOOD PRESSURE: 101 MMHG | BODY MASS INDEX: 28.66 KG/M2 | DIASTOLIC BLOOD PRESSURE: 72 MMHG

## 2025-07-18 PROBLEM — Q51.28 UTERUS DIDELPHYS: Status: ACTIVE | Noted: 2025-06-28

## 2025-07-18 PROBLEM — N92.0 MENORRHAGIA WITH REGULAR CYCLE: Status: ACTIVE | Noted: 2025-07-16

## 2025-07-18 PROCEDURE — 99213 OFFICE O/P EST LOW 20 MIN: CPT

## 2025-07-18 PROCEDURE — 76857 US EXAM PELVIC LIMITED: CPT | Mod: 59

## 2025-07-18 PROCEDURE — 76830 TRANSVAGINAL US NON-OB: CPT
